# Patient Record
Sex: FEMALE | Race: WHITE | NOT HISPANIC OR LATINO | URBAN - METROPOLITAN AREA
[De-identification: names, ages, dates, MRNs, and addresses within clinical notes are randomized per-mention and may not be internally consistent; named-entity substitution may affect disease eponyms.]

---

## 2017-01-13 ENCOUNTER — OUTPATIENT (OUTPATIENT)
Dept: OUTPATIENT SERVICES | Facility: HOSPITAL | Age: 52
LOS: 1 days | Discharge: HOME | End: 2017-01-13

## 2017-06-27 DIAGNOSIS — E78.2 MIXED HYPERLIPIDEMIA: ICD-10-CM

## 2017-06-27 DIAGNOSIS — E03.9 HYPOTHYROIDISM, UNSPECIFIED: ICD-10-CM

## 2017-06-27 DIAGNOSIS — N39.0 URINARY TRACT INFECTION, SITE NOT SPECIFIED: ICD-10-CM

## 2018-01-31 ENCOUNTER — OUTPATIENT (OUTPATIENT)
Dept: OUTPATIENT SERVICES | Facility: HOSPITAL | Age: 53
LOS: 1 days | Discharge: HOME | End: 2018-01-31

## 2018-01-31 DIAGNOSIS — E03.9 HYPOTHYROIDISM, UNSPECIFIED: ICD-10-CM

## 2018-01-31 DIAGNOSIS — N39.0 URINARY TRACT INFECTION, SITE NOT SPECIFIED: ICD-10-CM

## 2018-01-31 DIAGNOSIS — E87.8 OTHER DISORDERS OF ELECTROLYTE AND FLUID BALANCE, NOT ELSEWHERE CLASSIFIED: ICD-10-CM

## 2018-01-31 DIAGNOSIS — E78.2 MIXED HYPERLIPIDEMIA: ICD-10-CM

## 2018-01-31 DIAGNOSIS — K75.9 INFLAMMATORY LIVER DISEASE, UNSPECIFIED: ICD-10-CM

## 2018-01-31 DIAGNOSIS — E55.9 VITAMIN D DEFICIENCY, UNSPECIFIED: ICD-10-CM

## 2018-01-31 DIAGNOSIS — D50.9 IRON DEFICIENCY ANEMIA, UNSPECIFIED: ICD-10-CM

## 2019-02-01 ENCOUNTER — OUTPATIENT (OUTPATIENT)
Dept: OUTPATIENT SERVICES | Facility: HOSPITAL | Age: 54
LOS: 1 days | Discharge: HOME | End: 2019-02-01

## 2019-02-01 DIAGNOSIS — E78.2 MIXED HYPERLIPIDEMIA: ICD-10-CM

## 2019-02-01 DIAGNOSIS — E03.9 HYPOTHYROIDISM, UNSPECIFIED: ICD-10-CM

## 2021-01-18 ENCOUNTER — TRANSCRIPTION ENCOUNTER (OUTPATIENT)
Age: 56
End: 2021-01-18

## 2023-08-09 ENCOUNTER — APPOINTMENT (OUTPATIENT)
Dept: CARDIOLOGY | Facility: CLINIC | Age: 58
End: 2023-08-09
Payer: COMMERCIAL

## 2023-08-09 VITALS
BODY MASS INDEX: 40.85 KG/M2 | HEIGHT: 62 IN | SYSTOLIC BLOOD PRESSURE: 132 MMHG | HEART RATE: 82 BPM | DIASTOLIC BLOOD PRESSURE: 84 MMHG | WEIGHT: 222 LBS

## 2023-08-09 DIAGNOSIS — Z87.891 PERSONAL HISTORY OF NICOTINE DEPENDENCE: ICD-10-CM

## 2023-08-09 DIAGNOSIS — Z82.49 FAMILY HISTORY OF ISCHEMIC HEART DISEASE AND OTHER DISEASES OF THE CIRCULATORY SYSTEM: ICD-10-CM

## 2023-08-09 DIAGNOSIS — Z78.9 OTHER SPECIFIED HEALTH STATUS: ICD-10-CM

## 2023-08-09 PROBLEM — Z00.00 ENCOUNTER FOR PREVENTIVE HEALTH EXAMINATION: Status: ACTIVE | Noted: 2023-08-09

## 2023-08-09 PROCEDURE — 93000 ELECTROCARDIOGRAM COMPLETE: CPT

## 2023-08-09 PROCEDURE — 99204 OFFICE O/P NEW MOD 45 MIN: CPT

## 2023-08-09 RX ORDER — LEVOTHYROXINE SODIUM 0.05 MG/1
50 TABLET ORAL
Refills: 0 | Status: ACTIVE | COMMUNITY

## 2023-08-09 RX ORDER — LISINOPRIL 20 MG/1
20 TABLET ORAL
Refills: 0 | Status: ACTIVE | COMMUNITY

## 2023-08-09 NOTE — REASON FOR VISIT
[Other: ____] : [unfilled] [FreeTextEntry1] : Diagnostic Tests:  ----------------------- [id number]. Normal EKG.

## 2023-08-09 NOTE — HISTORY OF PRESENT ILLNESS
[FreeTextEntry1] : Ms. Lake is a 58yo F with PMHx of HTN, HLD, hypothyroid who presents to establish care. Her PMD Is Dr. Stanley Soler. She has been having chest burning with exertion along with SOB. For last few weeks. She had stress test many years ago. She is able to walk about 0.5 miles before symptoms occur. She can walk up 1-2 flights of stairs without symptoms. She will get some skipped beats at night.

## 2023-08-09 NOTE — ASSESSMENT
[FreeTextEntry1] : Chest pain: Typical in nature. Given HTN, HLD and FHx cannot rule out ischemic cause.  -Check CCTA.  -Metoprolol pretreatment given.  -Check TTE.  -Will get labs from Dr. Soler.  -If CP worsens or changes in any way, she will proceed to hospital for further work up.   HTN: BP at goal per ACC/AHA 2018 guidelines -Continue with lisinopril 20mg PO daily.  -Check TTE.   HLD:  -Continue to monitor. -Need labs.   Hypothyroid:  -Continue with levothyroxine.   Palpitations: -If continuing, will consider MCOT.   Follow up in 3 months.

## 2023-08-10 ENCOUNTER — RX RENEWAL (OUTPATIENT)
Age: 58
End: 2023-08-10

## 2023-08-21 ENCOUNTER — RX RENEWAL (OUTPATIENT)
Age: 58
End: 2023-08-21

## 2023-10-03 ENCOUNTER — APPOINTMENT (OUTPATIENT)
Dept: CARDIOLOGY | Facility: CLINIC | Age: 58
End: 2023-10-03
Payer: COMMERCIAL

## 2023-10-03 PROCEDURE — 93306 TTE W/DOPPLER COMPLETE: CPT

## 2023-10-08 ENCOUNTER — TRANSCRIPTION ENCOUNTER (OUTPATIENT)
Age: 58
End: 2023-10-08

## 2023-11-08 ENCOUNTER — APPOINTMENT (OUTPATIENT)
Dept: CARDIOLOGY | Facility: CLINIC | Age: 58
End: 2023-11-08
Payer: COMMERCIAL

## 2023-11-08 VITALS
BODY MASS INDEX: 41.41 KG/M2 | DIASTOLIC BLOOD PRESSURE: 80 MMHG | SYSTOLIC BLOOD PRESSURE: 142 MMHG | HEIGHT: 62 IN | HEART RATE: 80 BPM | WEIGHT: 225 LBS

## 2023-11-08 VITALS — DIASTOLIC BLOOD PRESSURE: 82 MMHG | SYSTOLIC BLOOD PRESSURE: 126 MMHG

## 2023-11-08 PROCEDURE — 93000 ELECTROCARDIOGRAM COMPLETE: CPT

## 2023-11-08 PROCEDURE — 99213 OFFICE O/P EST LOW 20 MIN: CPT

## 2023-11-14 DIAGNOSIS — R07.9 CHEST PAIN, UNSPECIFIED: ICD-10-CM

## 2023-11-14 RX ORDER — ASPIRIN ENTERIC COATED TABLETS 81 MG 81 MG/1
81 TABLET, DELAYED RELEASE ORAL DAILY
Qty: 90 | Refills: 3 | Status: ACTIVE | COMMUNITY
Start: 2023-11-14 | End: 1900-01-01

## 2023-11-16 LAB
ALBUMIN SERPL ELPH-MCNC: 4.6 G/DL
ALP BLD-CCNC: 94 U/L
ALT SERPL-CCNC: 39 U/L
ANION GAP SERPL CALC-SCNC: 14 MMOL/L
APTT BLD: 31.8 SEC
AST SERPL-CCNC: 21 U/L
BASOPHILS # BLD AUTO: 0.02 K/UL
BASOPHILS NFR BLD AUTO: 0.2 %
BILIRUB SERPL-MCNC: 0.3 MG/DL
BUN SERPL-MCNC: 12 MG/DL
CALCIUM SERPL-MCNC: 9.8 MG/DL
CHLORIDE SERPL-SCNC: 101 MMOL/L
CO2 SERPL-SCNC: 26 MMOL/L
CREAT SERPL-MCNC: 0.7 MG/DL
EGFR: 99 ML/MIN/1.73M2
EOSINOPHIL # BLD AUTO: 0.12 K/UL
EOSINOPHIL NFR BLD AUTO: 1.4 %
GLUCOSE SERPL-MCNC: 81 MG/DL
HCT VFR BLD CALC: 39.9 %
HGB BLD-MCNC: 13 G/DL
IMM GRANULOCYTES NFR BLD AUTO: 0.5 %
INR PPP: 1.01 RATIO
LYMPHOCYTES # BLD AUTO: 2.53 K/UL
LYMPHOCYTES NFR BLD AUTO: 30 %
MAN DIFF?: NORMAL
MCHC RBC-ENTMCNC: 30 PG
MCHC RBC-ENTMCNC: 32.6 G/DL
MCV RBC AUTO: 92.1 FL
MONOCYTES # BLD AUTO: 0.62 K/UL
MONOCYTES NFR BLD AUTO: 7.4 %
NEUTROPHILS # BLD AUTO: 5.09 K/UL
NEUTROPHILS NFR BLD AUTO: 60.5 %
PLATELET # BLD AUTO: 263 K/UL
POTASSIUM SERPL-SCNC: 4.3 MMOL/L
PROT SERPL-MCNC: 7.2 G/DL
PT BLD: 11.5 SEC
RBC # BLD: 4.33 M/UL
RBC # FLD: 12.6 %
SODIUM SERPL-SCNC: 141 MMOL/L
WBC # FLD AUTO: 8.42 K/UL

## 2023-11-17 VITALS
DIASTOLIC BLOOD PRESSURE: 78 MMHG | HEART RATE: 83 BPM | OXYGEN SATURATION: 98 % | RESPIRATION RATE: 18 BRPM | SYSTOLIC BLOOD PRESSURE: 153 MMHG

## 2023-11-17 NOTE — H&P CARDIOLOGY - HISTORY OF PRESENT ILLNESS
Patient is 57 y/o female with PMHx: HTN, hypothyroidism, HLD, + family h/o CAD who presented to cardiologist with c/o CP with exertion and shortness of breath that has been occurring over the passed few weeks.   Patient presents today for [facility] with possible intervention.     Pre cath note:    indication:  [ ] STEMI                [ ] NSTEMI                 [ ] Acute coronary syndrome                     [ ]Unstable Angina   [ ] high risk  [ ] intermediate risk  [ ] low risk                     [ ] Stable Angina     non-invasive testing:                          Date:                     result: [ ] high risk  [ ] intermediate risk  [ ] low risk    Anti- Anginal medications:                    [ ] not used                       [x ] used :      [ ]CCB  [x ] BB  [ ] Nitrate [ ] Ranexa                [ ] not used but strong indication not to use    Ejection Fraction                   [ ] <29            [ ] 30-39%   [ ] 40-49%     [ x]>50%    CHF                   [ ] active (within last 14 days on meds   [ ] Chronic (on meds but no exacerbation)    COPD                   [ ] mild (on chronic bronchodilators)  [ ] moderate (on chronic steroid therapy)      [ ] severe (indication for home O2 or PACO2 >50)    Other risk factors:                       [ ] Previous MI                     [ ] CVA/ stroke                    [ ] carotid stent/ CEA                    [ ] PVD/PAD- (arterial aneurysm, non-palpable pulses, tortuous vessel with inability to insert catheter, infra-renal dissection, renal or subclavian artery stenosis)                    [ ] diabetic                    [ ] previous CABG                    [ ] Renal Failure             Right Jemal Test:    Adjusted Cath Bleeding Risk: 1.2%    Pre-hydration:   Patient is 59 y/o female with PMHx: HTN, hypothyroidism, HLD, + family h/o CAD who presented to cardiologist with c/o CP with exertion and shortness of breath that has been occurring over the passed few weeks.   Patient presents today for [facility] with possible intervention.     Pre cath note:    indication:  [ ] STEMI                [ ] NSTEMI                 [ ] Acute coronary syndrome                     [ ]Unstable Angina   [ ] high risk  [ ] intermediate risk  [ ] low risk                     [ ] Stable Angina     non-invasive testing:      CCTA     Date:                     result: [ ] high risk  [ ] intermediate risk  [ ] low risk    Anti- Anginal medications:                    [ ] not used                       [x ] used :     CCB/BB given precath              [ ] not used but strong indication not to use    Ejection Fraction                   [ ] <29            [ ] 30-39%   [ ] 40-49%     [ x]>50%    CHF                   [ ] active (within last 14 days on meds   [ ] Chronic (on meds but no exacerbation)    COPD                   [ ] mild (on chronic bronchodilators)  [ ] moderate (on chronic steroid therapy)      [ ] severe (indication for home O2 or PACO2 >50)    Other risk factors:                       [ ] Previous MI                     [ ] CVA/ stroke                    [ ] carotid stent/ CEA                    [ ] PVD/PAD- (arterial aneurysm, non-palpable pulses, tortuous vessel with inability to insert catheter, infra-renal dissection, renal or subclavian artery stenosis)                    [ ] diabetic                    [ ] previous CABG                    [ ] Renal Failure             Right Jemal Test: positive    Adjusted Cath Bleeding Risk: 1.2%    Pre-hydration: NS 250ml bolus x 1 hour then 75ml/hr until cath   Patient is 57 y/o female with PMHx: HTN, hypothyroidism, HLD, + family h/o CAD who presented to cardiologist with c/o CP with exertion and shortness of breath that has been occurring over the passed few weeks. ABN CCTA  Patient presents today for [facility] with possible intervention.     Pre cath note:    indication:  [ ] STEMI                [ ] NSTEMI                 [ ] Acute coronary syndrome                     [ ]Unstable Angina   [ ] high risk  [ ] intermediate risk  [ ] low risk                     [ ] Stable Angina     non-invasive testing:      CCTA     Date:                     result: [ ] high risk  [ ] intermediate risk  [ ] low risk    Anti- Anginal medications:                    [ ] not used                       [x ] used :     CCB/BB given precath              [ ] not used but strong indication not to use    Ejection Fraction                   [ ] <29            [ ] 30-39%   [ ] 40-49%     [ x]>50%    CHF                   [ ] active (within last 14 days on meds   [ ] Chronic (on meds but no exacerbation)    COPD                   [ ] mild (on chronic bronchodilators)  [ ] moderate (on chronic steroid therapy)      [ ] severe (indication for home O2 or PACO2 >50)    Other risk factors:                       [ ] Previous MI                     [ ] CVA/ stroke                    [ ] carotid stent/ CEA                    [ ] PVD/PAD- (arterial aneurysm, non-palpable pulses, tortuous vessel with inability to insert catheter, infra-renal dissection, renal or subclavian artery stenosis)                    [ ] diabetic                    [ ] previous CABG                    [ ] Renal Failure             Right Jemal Test: positive    Adjusted Cath Bleeding Risk: 1.2%    Pre-hydration: NS 250ml bolus x 1 hour then 75ml/hr until cath   Patient is 57 y/o female with PMHx: HTN, hypothyroidism, HLD, + family h/o CAD who presented to cardiologist with c/o CP with exertion and shortness of breath that has been occurring over the passed few weeks. ABN CCTA 11/20/23 CAC 66.2, mLAD severe stenosis.   Patient presents today for [facility] with possible intervention.     Pre cath note:    indication:  [ ] STEMI                [ ] NSTEMI                 [ ] Acute coronary syndrome                     [ ]Unstable Angina   [ ] high risk  [ ] intermediate risk  [ ] low risk                     [ ] Stable Angina     non-invasive testing:      CCTA     Date:                     result: [ ] high risk  [ ] intermediate risk  [ ] low risk    Anti- Anginal medications:                    [ ] not used                       [x ] used :     CCB/BB given precath              [ ] not used but strong indication not to use    Ejection Fraction                   [ ] <29            [ ] 30-39%   [ ] 40-49%     [ x]>50%    CHF                   [ ] active (within last 14 days on meds   [ ] Chronic (on meds but no exacerbation)    COPD                   [ ] mild (on chronic bronchodilators)  [ ] moderate (on chronic steroid therapy)      [ ] severe (indication for home O2 or PACO2 >50)    Other risk factors:                       [ ] Previous MI                     [ ] CVA/ stroke                    [ ] carotid stent/ CEA                    [ ] PVD/PAD- (arterial aneurysm, non-palpable pulses, tortuous vessel with inability to insert catheter, infra-renal dissection, renal or subclavian artery stenosis)                    [ ] diabetic                    [ ] previous CABG                    [ ] Renal Failure             Right Jemal Test: positive    Adjusted Cath Bleeding Risk: 1.2%    Pre-hydration: NS 250ml bolus x 1 hour then 75ml/hr until cath

## 2023-11-20 ENCOUNTER — INPATIENT (INPATIENT)
Facility: HOSPITAL | Age: 58
LOS: 0 days | Discharge: HOME | DRG: 322 | End: 2023-11-21
Attending: STUDENT IN AN ORGANIZED HEALTH CARE EDUCATION/TRAINING PROGRAM | Admitting: STUDENT IN AN ORGANIZED HEALTH CARE EDUCATION/TRAINING PROGRAM
Payer: COMMERCIAL

## 2023-11-20 ENCOUNTER — TRANSCRIPTION ENCOUNTER (OUTPATIENT)
Age: 58
End: 2023-11-20

## 2023-11-20 DIAGNOSIS — R07.9 CHEST PAIN, UNSPECIFIED: ICD-10-CM

## 2023-11-20 LAB
ANION GAP SERPL CALC-SCNC: 10 MMOL/L — SIGNIFICANT CHANGE UP (ref 7–14)
ANION GAP SERPL CALC-SCNC: 10 MMOL/L — SIGNIFICANT CHANGE UP (ref 7–14)
BUN SERPL-MCNC: 15 MG/DL — SIGNIFICANT CHANGE UP (ref 10–20)
BUN SERPL-MCNC: 15 MG/DL — SIGNIFICANT CHANGE UP (ref 10–20)
CALCIUM SERPL-MCNC: 9.9 MG/DL — SIGNIFICANT CHANGE UP (ref 8.4–10.4)
CALCIUM SERPL-MCNC: 9.9 MG/DL — SIGNIFICANT CHANGE UP (ref 8.4–10.4)
CHLORIDE SERPL-SCNC: 105 MMOL/L — SIGNIFICANT CHANGE UP (ref 98–110)
CHLORIDE SERPL-SCNC: 105 MMOL/L — SIGNIFICANT CHANGE UP (ref 98–110)
CO2 SERPL-SCNC: 27 MMOL/L — SIGNIFICANT CHANGE UP (ref 17–32)
CO2 SERPL-SCNC: 27 MMOL/L — SIGNIFICANT CHANGE UP (ref 17–32)
CREAT SERPL-MCNC: 0.7 MG/DL — SIGNIFICANT CHANGE UP (ref 0.7–1.5)
CREAT SERPL-MCNC: 0.7 MG/DL — SIGNIFICANT CHANGE UP (ref 0.7–1.5)
EGFR: 99 ML/MIN/1.73M2 — SIGNIFICANT CHANGE UP
EGFR: 99 ML/MIN/1.73M2 — SIGNIFICANT CHANGE UP
GLUCOSE SERPL-MCNC: 99 MG/DL — SIGNIFICANT CHANGE UP (ref 70–99)
GLUCOSE SERPL-MCNC: 99 MG/DL — SIGNIFICANT CHANGE UP (ref 70–99)
HCT VFR BLD CALC: 41.2 % — SIGNIFICANT CHANGE UP (ref 37–47)
HCT VFR BLD CALC: 41.2 % — SIGNIFICANT CHANGE UP (ref 37–47)
HGB BLD-MCNC: 13.7 G/DL — SIGNIFICANT CHANGE UP (ref 12–16)
HGB BLD-MCNC: 13.7 G/DL — SIGNIFICANT CHANGE UP (ref 12–16)
MCHC RBC-ENTMCNC: 29.3 PG — SIGNIFICANT CHANGE UP (ref 27–31)
MCHC RBC-ENTMCNC: 29.3 PG — SIGNIFICANT CHANGE UP (ref 27–31)
MCHC RBC-ENTMCNC: 33.3 G/DL — SIGNIFICANT CHANGE UP (ref 32–37)
MCHC RBC-ENTMCNC: 33.3 G/DL — SIGNIFICANT CHANGE UP (ref 32–37)
MCV RBC AUTO: 88.2 FL — SIGNIFICANT CHANGE UP (ref 81–99)
MCV RBC AUTO: 88.2 FL — SIGNIFICANT CHANGE UP (ref 81–99)
NRBC # BLD: 0 /100 WBCS — SIGNIFICANT CHANGE UP (ref 0–0)
NRBC # BLD: 0 /100 WBCS — SIGNIFICANT CHANGE UP (ref 0–0)
PLATELET # BLD AUTO: 289 K/UL — SIGNIFICANT CHANGE UP (ref 130–400)
PLATELET # BLD AUTO: 289 K/UL — SIGNIFICANT CHANGE UP (ref 130–400)
PMV BLD: 9.6 FL — SIGNIFICANT CHANGE UP (ref 7.4–10.4)
PMV BLD: 9.6 FL — SIGNIFICANT CHANGE UP (ref 7.4–10.4)
POTASSIUM SERPL-MCNC: 4.5 MMOL/L — SIGNIFICANT CHANGE UP (ref 3.5–5)
POTASSIUM SERPL-MCNC: 4.5 MMOL/L — SIGNIFICANT CHANGE UP (ref 3.5–5)
POTASSIUM SERPL-SCNC: 4.5 MMOL/L — SIGNIFICANT CHANGE UP (ref 3.5–5)
POTASSIUM SERPL-SCNC: 4.5 MMOL/L — SIGNIFICANT CHANGE UP (ref 3.5–5)
RBC # BLD: 4.67 M/UL — SIGNIFICANT CHANGE UP (ref 4.2–5.4)
RBC # BLD: 4.67 M/UL — SIGNIFICANT CHANGE UP (ref 4.2–5.4)
RBC # FLD: 12.2 % — SIGNIFICANT CHANGE UP (ref 11.5–14.5)
RBC # FLD: 12.2 % — SIGNIFICANT CHANGE UP (ref 11.5–14.5)
SODIUM SERPL-SCNC: 142 MMOL/L — SIGNIFICANT CHANGE UP (ref 135–146)
SODIUM SERPL-SCNC: 142 MMOL/L — SIGNIFICANT CHANGE UP (ref 135–146)
WBC # BLD: 8.68 K/UL — SIGNIFICANT CHANGE UP (ref 4.8–10.8)
WBC # BLD: 8.68 K/UL — SIGNIFICANT CHANGE UP (ref 4.8–10.8)
WBC # FLD AUTO: 8.68 K/UL — SIGNIFICANT CHANGE UP (ref 4.8–10.8)
WBC # FLD AUTO: 8.68 K/UL — SIGNIFICANT CHANGE UP (ref 4.8–10.8)

## 2023-11-20 PROCEDURE — 92978 ENDOLUMINL IVUS OCT C 1ST: CPT | Mod: RC

## 2023-11-20 PROCEDURE — 93005 ELECTROCARDIOGRAM TRACING: CPT

## 2023-11-20 PROCEDURE — 93458 L HRT ARTERY/VENTRICLE ANGIO: CPT | Mod: 59

## 2023-11-20 PROCEDURE — 36415 COLL VENOUS BLD VENIPUNCTURE: CPT

## 2023-11-20 PROCEDURE — 93458 L HRT ARTERY/VENTRICLE ANGIO: CPT | Mod: 26,XU

## 2023-11-20 PROCEDURE — 85027 COMPLETE CBC AUTOMATED: CPT

## 2023-11-20 PROCEDURE — C9600: CPT | Mod: RC

## 2023-11-20 PROCEDURE — 92978 ENDOLUMINL IVUS OCT C 1ST: CPT | Mod: 26

## 2023-11-20 PROCEDURE — 93010 ELECTROCARDIOGRAM REPORT: CPT

## 2023-11-20 PROCEDURE — 80048 BASIC METABOLIC PNL TOTAL CA: CPT

## 2023-11-20 PROCEDURE — 83735 ASSAY OF MAGNESIUM: CPT

## 2023-11-20 PROCEDURE — 92928 PRQ TCAT PLMT NTRAC ST 1 LES: CPT | Mod: RC

## 2023-11-20 RX ORDER — ONDANSETRON 8 MG/1
4 TABLET, FILM COATED ORAL ONCE
Refills: 0 | Status: DISCONTINUED | OUTPATIENT
Start: 2023-11-20 | End: 2023-11-21

## 2023-11-20 RX ORDER — CLOPIDOGREL BISULFATE 75 MG/1
1 TABLET, FILM COATED ORAL
Qty: 30 | Refills: 0
Start: 2023-11-20 | End: 2023-12-19

## 2023-11-20 RX ORDER — AMLODIPINE BESYLATE 2.5 MG/1
2.5 TABLET ORAL ONCE
Refills: 0 | Status: COMPLETED | OUTPATIENT
Start: 2023-11-20 | End: 2023-11-20

## 2023-11-20 RX ORDER — FAMOTIDINE 10 MG/ML
20 INJECTION INTRAVENOUS DAILY
Refills: 0 | Status: DISCONTINUED | OUTPATIENT
Start: 2023-11-20 | End: 2023-11-21

## 2023-11-20 RX ORDER — LISINOPRIL 2.5 MG/1
20 TABLET ORAL DAILY
Refills: 0 | Status: DISCONTINUED | OUTPATIENT
Start: 2023-11-20 | End: 2023-11-21

## 2023-11-20 RX ORDER — CLOPIDOGREL BISULFATE 75 MG/1
75 TABLET, FILM COATED ORAL DAILY
Refills: 0 | Status: DISCONTINUED | OUTPATIENT
Start: 2023-11-21 | End: 2023-11-21

## 2023-11-20 RX ORDER — ATORVASTATIN CALCIUM 80 MG/1
40 TABLET, FILM COATED ORAL AT BEDTIME
Refills: 0 | Status: DISCONTINUED | OUTPATIENT
Start: 2023-11-20 | End: 2023-11-21

## 2023-11-20 RX ORDER — METOPROLOL TARTRATE 50 MG
25 TABLET ORAL DAILY
Refills: 0 | Status: DISCONTINUED | OUTPATIENT
Start: 2023-11-20 | End: 2023-11-21

## 2023-11-20 RX ORDER — ASPIRIN/CALCIUM CARB/MAGNESIUM 324 MG
81 TABLET ORAL DAILY
Refills: 0 | Status: DISCONTINUED | OUTPATIENT
Start: 2023-11-21 | End: 2023-11-21

## 2023-11-20 RX ORDER — ASPIRIN/CALCIUM CARB/MAGNESIUM 324 MG
1 TABLET ORAL
Qty: 30 | Refills: 0
Start: 2023-11-20 | End: 2023-12-19

## 2023-11-20 RX ORDER — ASPIRIN/CALCIUM CARB/MAGNESIUM 324 MG
325 TABLET ORAL DAILY
Refills: 0 | Status: DISCONTINUED | OUTPATIENT
Start: 2023-11-20 | End: 2023-11-20

## 2023-11-20 RX ORDER — SODIUM CHLORIDE 9 MG/ML
250 INJECTION INTRAMUSCULAR; INTRAVENOUS; SUBCUTANEOUS ONCE
Refills: 0 | Status: COMPLETED | OUTPATIENT
Start: 2023-11-20 | End: 2023-11-20

## 2023-11-20 RX ORDER — LEVOTHYROXINE SODIUM 125 MCG
50 TABLET ORAL DAILY
Refills: 0 | Status: DISCONTINUED | OUTPATIENT
Start: 2023-11-20 | End: 2023-11-21

## 2023-11-20 RX ORDER — SODIUM CHLORIDE 9 MG/ML
1000 INJECTION INTRAMUSCULAR; INTRAVENOUS; SUBCUTANEOUS
Refills: 0 | Status: COMPLETED | OUTPATIENT
Start: 2023-11-20 | End: 2023-11-20

## 2023-11-20 RX ORDER — CLOPIDOGREL BISULFATE 75 MG/1
600 TABLET, FILM COATED ORAL ONCE
Refills: 0 | Status: COMPLETED | OUTPATIENT
Start: 2023-11-20 | End: 2023-11-20

## 2023-11-20 RX ORDER — SODIUM CHLORIDE 9 MG/ML
1000 INJECTION INTRAMUSCULAR; INTRAVENOUS; SUBCUTANEOUS
Refills: 0 | Status: DISCONTINUED | OUTPATIENT
Start: 2023-11-20 | End: 2023-11-21

## 2023-11-20 RX ORDER — ATORVASTATIN CALCIUM 80 MG/1
1 TABLET, FILM COATED ORAL
Qty: 30 | Refills: 0
Start: 2023-11-20 | End: 2023-12-19

## 2023-11-20 RX ORDER — FAMOTIDINE 10 MG/ML
20 INJECTION INTRAVENOUS ONCE
Refills: 0 | Status: COMPLETED | OUTPATIENT
Start: 2023-11-20 | End: 2023-11-20

## 2023-11-20 RX ORDER — METOPROLOL TARTRATE 50 MG
1 TABLET ORAL
Qty: 30 | Refills: 0
Start: 2023-11-20 | End: 2023-12-19

## 2023-11-20 RX ORDER — FAMOTIDINE 10 MG/ML
1 INJECTION INTRAVENOUS
Qty: 30 | Refills: 0
Start: 2023-11-20 | End: 2023-12-19

## 2023-11-20 RX ORDER — NITROGLYCERIN 6.5 MG
5 CAPSULE, EXTENDED RELEASE ORAL
Qty: 50 | Refills: 0 | Status: DISCONTINUED | OUTPATIENT
Start: 2023-11-20 | End: 2023-11-20

## 2023-11-20 RX ORDER — METOPROLOL TARTRATE 50 MG
12.5 TABLET ORAL ONCE
Refills: 0 | Status: COMPLETED | OUTPATIENT
Start: 2023-11-20 | End: 2023-11-20

## 2023-11-20 RX ADMIN — CLOPIDOGREL BISULFATE 600 MILLIGRAM(S): 75 TABLET, FILM COATED ORAL at 12:48

## 2023-11-20 RX ADMIN — FAMOTIDINE 20 MILLIGRAM(S): 10 INJECTION INTRAVENOUS at 13:32

## 2023-11-20 RX ADMIN — ATORVASTATIN CALCIUM 40 MILLIGRAM(S): 80 TABLET, FILM COATED ORAL at 21:30

## 2023-11-20 RX ADMIN — SODIUM CHLORIDE 150 MILLILITER(S): 9 INJECTION INTRAMUSCULAR; INTRAVENOUS; SUBCUTANEOUS at 21:00

## 2023-11-20 RX ADMIN — SODIUM CHLORIDE 100 MILLILITER(S): 9 INJECTION INTRAMUSCULAR; INTRAVENOUS; SUBCUTANEOUS at 21:00

## 2023-11-20 RX ADMIN — Medication 325 MILLIGRAM(S): at 12:34

## 2023-11-20 RX ADMIN — Medication 12.5 MILLIGRAM(S): at 12:30

## 2023-11-20 RX ADMIN — AMLODIPINE BESYLATE 2.5 MILLIGRAM(S): 2.5 TABLET ORAL at 12:30

## 2023-11-20 RX ADMIN — SODIUM CHLORIDE 150 MILLILITER(S): 9 INJECTION INTRAMUSCULAR; INTRAVENOUS; SUBCUTANEOUS at 14:40

## 2023-11-20 RX ADMIN — SODIUM CHLORIDE 250 MILLILITER(S): 9 INJECTION INTRAMUSCULAR; INTRAVENOUS; SUBCUTANEOUS at 12:30

## 2023-11-20 NOTE — ASU PATIENT PROFILE, ADULT - PRO ARRIVE FROM
Controlled medication requested: amphetamine-dextroamphetamine (ADDERALL XR) 20 MG 24 hr capsule    Last drug screen/contract: NA     Last refilled per PDMP: Unable to view providers PDMP    Last seen by PCP: 5/23/22 CPE  Recommendations: ADHD (attention deficit hyperactivity disorder), combined type: Continue current management. Refills provided.    Advised to follow up in: 1y    Next visit with PCP on: 5/25/23    Refill: pended to provider for approval. Please advise, thank you.   home

## 2023-11-20 NOTE — DISCHARGE NOTE PROVIDER - ATTENDING DISCHARGE PHYSICAL EXAMINATION:
I saw and evaluated the patient the day of discharge.  They are s/p elective LHC demonstrating significant 2 vessel disease, now s/p PCI of the mRCA with plans for staged PCI of the LAD in 4-6 weeks.  Day of discharge: well appearing, no active complaints, hemodynamically stable.  Discharge with close follow up with cardiology.

## 2023-11-20 NOTE — DISCHARGE NOTE PROVIDER - NSDCFUSCHEDAPPT_GEN_ALL_CORE_FT
Venkatesh Vo Physician [facility]  CARDIOLOGY [address]  Scheduled Appointment: 12/12/2023

## 2023-11-20 NOTE — PATIENT PROFILE ADULT - FUNCTIONAL ASSESSMENT - DAILY ACTIVITY SECTION LABEL
Bursitis: Care Instructions  Your Care Instructions  A bursa is a small sac of fluid that helps the tissues around a joint slide over one another easily. Injury or overuse of a joint can cause pain, redness, and inflammation in the bursa (bursitis). Bursitis usually gets better if you avoid the activity that caused it. You can help prevent bursitis from coming back by doing stretching and strengthening exercises. You may also need to change the way you do some activities. Follow-up care is a key part of your treatment and safety. Be sure to make and go to all appointments, and call your doctor if you are having problems. Its also a good idea to know your test results and keep a list of the medicines you take. How can you care for yourself at home? · Put ice or a cold pack on the area for 10 to 20 minutes at a time. Try to do this every 1 to 2 hours for the next 3 days (when you are awake) or until the swelling goes down. Put a thin cloth between the ice and your skin. · After the 3 days of using ice, you may use heat on the area. You can use a hot water bottle; a warm, moist towel; or a heating pad set on low. You can also try alternating heat and ice. · Rest the area where you have pain. Stop any activities that cause pain. Switch to activities that do not stress the area. · Take pain medicines exactly as directed. ¨ If the doctor gave you a prescription medicine for pain, take it as prescribed. ¨ If you are not taking a prescription pain medicine, ask your doctor if you can take an over-the-counter medicine. ¨ Do not take two or more pain medicines at the same time unless the doctor told you to. Many pain medicines have acetaminophen, which is Tylenol. Too much acetaminophen (Tylenol) can be harmful. · To prevent stiffness, gently move the joint as much as you can without pain every day. As the pain gets better, keep doing range-of-motion exercises.  Ask your doctor for exercises that will make the muscles around the joint stronger. Do these as directed. · You can slowly return to the activity that caused the pain, but do it with less effort until you can do it without pain or swelling. Be sure to warm up before and stretch after you do the activity. When should you call for help? Call your doctor now or seek immediate medical care if:  · You get a fever and chills. · You have increased swelling or redness in a joint. · You cannot use a joint, or the pain in a joint gets worse. Watch closely for changes in your health, and be sure to contact your doctor if:  · You have pain for 2 weeks or longer despite home treatment. Where can you learn more? Go to http://jocelyn-yaritza.info/. Enter T703 in the search box to learn more about \"Bursitis: Care Instructions. \"  Current as of: May 23, 2016  Content Version: 11.2  © 9130-6045 [employer]. Care instructions adapted under license by [employer] (which disclaims liability or warranty for this information). If you have questions about a medical condition or this instruction, always ask your healthcare professional. [place] [address] any warranty or liability for your use of this information. .

## 2023-11-20 NOTE — DISCHARGE NOTE PROVIDER - NSDCMRMEDTOKEN_GEN_ALL_CORE_FT
Aspirin Enteric Coated 81 mg oral delayed release tablet: 1 tab(s) orally once a day MDD: 1  atorvastatin 40 mg oral tablet: 1 tab(s) orally once a day (at bedtime) MDD: 1  clopidogrel 75 mg oral tablet: 1 tab(s) orally once a day MDD: 1  famotidine 20 mg oral tablet: 1 tab(s) orally once a day MDD: 1  levothyroxine 50 mcg (0.05 mg) oral tablet: 1 tab(s) orally once a day  lisinopril 20 mg oral tablet: 1 tab(s) orally once a day  Toprol-XL 25 mg oral tablet, extended release: 1 tab(s) orally once a day MDD: 1

## 2023-11-20 NOTE — PROGRESS NOTE ADULT - SUBJECTIVE AND OBJECTIVE BOX
Cardiology Follow up    BLAINE LOCKE   58y Female  PAST MEDICAL & SURGICAL HISTORY:  HTN (hypertension)      HLD (hyperlipidemia)      Hypothyroidism           HPI:  Patient is 59 y/o female with PMHx: HTN, hypothyroidism, HLD, + family h/o CAD who presented to cardiologist with c/o CP with exertion and shortness of breath that has been occurring over the passed few weeks. ABN CCTA 11/20/23 CAC 66.2, mLAD severe stenosis.   Patient presents today for [facility] with possible intervention.     Pre cath note:    indication:  [ ] STEMI                [ ] NSTEMI                 [ ] Acute coronary syndrome                     [ ]Unstable Angina   [ ] high risk  [ ] intermediate risk  [ ] low risk                     [ ] Stable Angina     non-invasive testing:      CCTA     Date:                     result: [ ] high risk  [ ] intermediate risk  [ ] low risk    Anti- Anginal medications:                    [ ] not used                       [x ] used :     CCB/BB given precath              [ ] not used but strong indication not to use    Ejection Fraction                   [ ] <29            [ ] 30-39%   [ ] 40-49%     [ x]>50%    CHF                   [ ] active (within last 14 days on meds   [ ] Chronic (on meds but no exacerbation)    COPD                   [ ] mild (on chronic bronchodilators)  [ ] moderate (on chronic steroid therapy)      [ ] severe (indication for home O2 or PACO2 >50)    Other risk factors:                       [ ] Previous MI                     [ ] CVA/ stroke                    [ ] carotid stent/ CEA                    [ ] PVD/PAD- (arterial aneurysm, non-palpable pulses, tortuous vessel with inability to insert catheter, infra-renal dissection, renal or subclavian artery stenosis)                    [ ] diabetic                    [ ] previous CABG                    [ ] Renal Failure             Right Jemal Test: positive    Adjusted Cath Bleeding Risk: 1.2%    Pre-hydration: NS 250ml bolus x 1 hour then 75ml/hr until cath   (17 Nov 2023 15:28)    Allergies    doxycycline (Rash)    Intolerances      Patient seen and examined at bedside.   Patient without complaints.   Denies CP, SOB, palpitations, or dizziness    Vital Signs  HR: 73  BP: 163/74  SpO2: 98%  on RA        MEDICATIONS  (STANDING):  aspirin enteric coated 325 milliGRAM(s) Oral daily  nitroglycerin  Infusion 5 MICROgram(s)/Min (1.5 mL/Hr) IV Continuous <Continuous>  ondansetron Injectable 4 milliGRAM(s) IV Push once  sodium chloride 0.9%. 1000 milliLiter(s) (150 mL/Hr) IV Continuous <Continuous>    MEDICATIONS  (PRN):      REVIEW OF SYSTEMS:          All negative except as mentioned in HPI    PHYSICAL EXAM:           CONSTITUTIONAL: Well-developed; well-nourished; in no acute distress  	SKIN: warm, dry  	HEAD: Normocephalic; atraumatic  	EYES: PERRL.  	ENT: No nasal discharge, airway clear, mucous membranes moist  	NECK: Supple; non tender.  	CARD: +S1, +S2, no murmurs, gallops, or rubs. Regular rate and rhythm    	RESP: No wheezes, rales or rhonchi. CTA B/L  	ABD: soft ntnd, + BS x 4 quadrants  	EXT: moves all extremities,  no clubbing, cyanosis or edema  	NEURO: Alert and oriented x3, no focal deficits          PSYCH: Cooperative, appropriate          VASCULAR:  + Rad / + PTs / + DPs          EXTREMITY:             	   Right Radial: Dressing D/C/I, DSTAT in place, access site soft, no hematoma, no pain, + pulses, no sign of infection, no numbness            ECG: pending    LABS:                        13.7   8.68  )-----------( 289      ( 20 Nov 2023 12:35 )             41.2     11-20    142  |  105  |  15  ----------------------------<  99  4.5   |  27  |  0.7    Ca    9.9      20 Nov 2023 12:35      A/P:  I discussed the case with Cardiologist Dr. Ricardo  and recommend the following:    S/P PCI:      Intervention:   Successful IVUS guided PCI with PAM to mid RCA  AUC score of 7    Implants:    STAN FRONTIER RX [id number]    FINDINGS:     Coronary Dominance:   Right    LM:   no disease     LAD:   proximal 80% lesion   mid moderate disease   distal mild disease     D1   mild disease       CX:   mild disease     RCA:   proximal mild disease   mid mild disease   distal 90% lesion      LVEDP: 17 mmHg             	     Continue DAPT ( Aspirin 81 mg PO Daily and Plavix 75mg po daily ),  B-Blocker, Statin Therapy, lisinopril                   Patient given 30 day supply of ( Aspirin 81 mg daily and Plavix 75 mg daily ) to take at home                   START Famotidine 20mg po daily                   CBC at 1830                   EKG prior to d/c at 1830                   NS at 2030 ml/hr x 6hrs                   OOB Ambulate                    Monitor access site/ distal pulses                   Patient agreeing to take DAPT for at least one year or as directed by cardiologist                    Pt given instructions on importance of taking antiplatelet medication or risk acute stent thrombosis/death                   Post cath instructions, access site care and activity restrictions reviewed with patient                     Discussed with patient to return to hospital if experience chest pain, shortness breath, dizziness and site bleeding                   Aggressive risk factor modification, diet counseling, smoking cessation discussed with patient                    Benefits of cardiac rehab discussed with patient and all documents sent to cardiac rehab center                   Patient instructed to call cardiac rehab and make initial appointment after first follow-up visit with Cardiologist                    Return for staged procedure to LAD in 4-6 weeks                   Can discharge patient at 2030 from cardiac standpoint after ambulating without symptoms, access site wnl, labs and ECG reviewed                    Follow up with Cardiology Dr. Vo  in two weeks.  Instructed to call and make an appointment

## 2023-11-20 NOTE — ASU DISCHARGE PLAN (ADULT/PEDIATRIC) - CARE PROVIDER_API CALL
Venkatesh Vo  Cardiology  [address] [place] [id number]  Phone: [phone]  Fax: [phone]  Follow Up Time: 2 weeks

## 2023-11-20 NOTE — PATIENT PROFILE ADULT - FALL HARM RISK - HARM RISK INTERVENTIONS

## 2023-11-20 NOTE — DISCHARGE NOTE PROVIDER - CARE PROVIDER_API CALL
Venkatesh Vo  Cardiology  [address] [place] [id number]  Phone: [phone]  Fax: [phone]  Established Patient  Follow Up Time: 2 weeks

## 2023-11-20 NOTE — DISCHARGE NOTE PROVIDER - HOSPITAL COURSE
57 y/o female with PMHx: HTN, hypothyroidism, HLD, + family h/o CAD who presented to cardiologist with c/o CP with exertion and shortness of breath that has been occurring over the passed few weeks. ABN CCTA 11/20/23 CAC 66.2, mLAD severe stenosis. Pt presented to [facility] for elective cardiac catheterization.     On 11/20/23patient underwent LHC which revealed: distal RCA with 90% lesion. s/p successful IVUS guided PCI with 1x PAM to mid RCA (STAN FRONTIER RX [id number]). Post-procedure pt with c/o chest discomfort in which she was trialed on nitro gtt and has now weaned off.     Patient was monitored overnight. On POD 1 patient remains HD stable with no complaints. Patient remains in SR with no arrhythmias noted on tele. EKG performed showed no acute ST changes. Examination of right radial artery access site showed a C/DI site with no hematoma, erythema or bruit. Distal pulses are 2+ bilaterally. Renal function remains stable post cath. Patient will be discharged home on DAPT with ASA and Plavix. Patient was also found to have 80% stenosis in the proximal LAD and will return in 6 weeks for staged PCI to LAD. Patient is being DC home in stable condition.    Left heart catheterization findings 11/20/23:  Intervention:   Successful IVUS guided PCI with PAM to mid RCA  AUC score of 7  Implants:    STAN FRONTIER RX [id number]  FINDINGS:   Coronary Dominance:   Right  LM:   no disease   LAD:   proximal 80% lesion   mid moderate disease   distal mild disease   D1   mild disease   CX:   mild disease   RCA:   proximal mild disease   mid mild disease   distal 90% lesion      LVEDP: 17 mmHg

## 2023-11-20 NOTE — CHART NOTE - NSCHARTNOTEFT_GEN_A_CORE
PRE-OP DIAGNOSIS:        PROCEDURE:   [x] Coronary Angiogram   [x] [facility]   [x] Intervention (see below)         PHYSICIAN:    Dr. Ricardo     ASSISTANT:    Dr. Sena Rod     PROCEDURE DESCRIPTION:     Consent:      [x] Patient     [] Family Member     []  Used        Anesthesia:     [] General     [x] Sedation   [x] Local        Access & Closure:     [x]6 Fr right Radial Artery > D-stat          IV Contrast: 75mL        Intervention:   Successful IVUS guided PCI with PAM to mid RCA  AUC score of 7    Implants:    STAN FRONTIER RX [id number]    FINDINGS:     Coronary Dominance:   Right    LM:   no disease     LAD:   proximal 80% lesion   mid moderate disease   distal mild disease     D1   mild disease       CX:   mild disease     RCA:   proximal mild disease   mid mild disease   distal 90% lesion      LVEDP: 17 mmHg            ESTIMATED BLOOD LOSS: < 10 mL        CONDITION:     [x] Good     [] Fair     [] Critical        SPECIMEN REMOVED: N/A       POST-OP DIAGNOSIS:    Two vessel coronary artery disease   Successful IVUS guided PCI with PAM to mid RCA (STAN FRONTIER RX [id number])  AUC score of 7       PLAN OF CARE:     [x] D/C Home Today   [x] Medications:   DAPT with aspirin and plavix  cont with OMT   [x] Post-procedure LVEDP-guided IV fluids:   [x] Encourage oral fluids daily PRE-OP DIAGNOSIS:        PROCEDURE:   [x] Coronary Angiogram   [x] [facility]   [x] Intervention (see below)         PHYSICIAN:    Dr. Ricardo     ASSISTANT:    Dr. Sena Rod     PROCEDURE DESCRIPTION:     Consent:      [x] Patient     [] Family Member     []  Used        Anesthesia:     [] General     [x] Sedation   [x] Local        Access & Closure:     [x]6 Fr right Radial Artery > D-stat          IV Contrast: 75mL        Intervention:   Successful IVUS guided PCI with PAM to mid RCA  AUC score of 7    Implants:    STAN FRONTIER RX [id number]    FINDINGS:     Coronary Dominance:   Right    LM:   no disease     LAD:   proximal 80% lesion   mid moderate disease   distal mild disease     D1   mild disease       CX:   mild disease     RCA:   proximal mild disease   mid mild disease   distal 95% lesion      LVEDP: 17 mmHg            ESTIMATED BLOOD LOSS: < 10 mL        CONDITION:     [x] Good     [] Fair     [] Critical        SPECIMEN REMOVED: N/A       POST-OP DIAGNOSIS:    Two vessel coronary artery disease   Successful IVUS guided PCI with PAM to mid RCA (STAN FRONTIER RX [id number])  AUC score of 7       PLAN OF CARE:     [x] D/C Home Today   [x] Medications:   DAPT with aspirin and plavix  cont with OMT   [x] Post-procedure LVEDP-guided IV fluids:   [x] Encourage oral fluids daily

## 2023-11-21 ENCOUNTER — TRANSCRIPTION ENCOUNTER (OUTPATIENT)
Age: 58
End: 2023-11-21

## 2023-11-21 VITALS — DIASTOLIC BLOOD PRESSURE: 79 MMHG | SYSTOLIC BLOOD PRESSURE: 152 MMHG | TEMPERATURE: 98 F | HEART RATE: 66 BPM

## 2023-11-21 LAB
ANION GAP SERPL CALC-SCNC: 11 MMOL/L — SIGNIFICANT CHANGE UP (ref 7–14)
ANION GAP SERPL CALC-SCNC: 11 MMOL/L — SIGNIFICANT CHANGE UP (ref 7–14)
BUN SERPL-MCNC: 14 MG/DL — SIGNIFICANT CHANGE UP (ref 10–20)
BUN SERPL-MCNC: 14 MG/DL — SIGNIFICANT CHANGE UP (ref 10–20)
CALCIUM SERPL-MCNC: 8.7 MG/DL — SIGNIFICANT CHANGE UP (ref 8.4–10.4)
CALCIUM SERPL-MCNC: 8.7 MG/DL — SIGNIFICANT CHANGE UP (ref 8.4–10.4)
CHLORIDE SERPL-SCNC: 108 MMOL/L — SIGNIFICANT CHANGE UP (ref 98–110)
CHLORIDE SERPL-SCNC: 108 MMOL/L — SIGNIFICANT CHANGE UP (ref 98–110)
CO2 SERPL-SCNC: 24 MMOL/L — SIGNIFICANT CHANGE UP (ref 17–32)
CO2 SERPL-SCNC: 24 MMOL/L — SIGNIFICANT CHANGE UP (ref 17–32)
CREAT SERPL-MCNC: 0.6 MG/DL — LOW (ref 0.7–1.5)
CREAT SERPL-MCNC: 0.6 MG/DL — LOW (ref 0.7–1.5)
EGFR: 103 ML/MIN/1.73M2 — SIGNIFICANT CHANGE UP
EGFR: 103 ML/MIN/1.73M2 — SIGNIFICANT CHANGE UP
GLUCOSE SERPL-MCNC: 88 MG/DL — SIGNIFICANT CHANGE UP (ref 70–99)
GLUCOSE SERPL-MCNC: 88 MG/DL — SIGNIFICANT CHANGE UP (ref 70–99)
HCT VFR BLD CALC: 36.2 % — LOW (ref 37–47)
HCT VFR BLD CALC: 36.2 % — LOW (ref 37–47)
HGB BLD-MCNC: 11.8 G/DL — LOW (ref 12–16)
HGB BLD-MCNC: 11.8 G/DL — LOW (ref 12–16)
MAGNESIUM SERPL-MCNC: 1.9 MG/DL — SIGNIFICANT CHANGE UP (ref 1.8–2.4)
MAGNESIUM SERPL-MCNC: 1.9 MG/DL — SIGNIFICANT CHANGE UP (ref 1.8–2.4)
MCHC RBC-ENTMCNC: 29.7 PG — SIGNIFICANT CHANGE UP (ref 27–31)
MCHC RBC-ENTMCNC: 29.7 PG — SIGNIFICANT CHANGE UP (ref 27–31)
MCHC RBC-ENTMCNC: 32.6 G/DL — SIGNIFICANT CHANGE UP (ref 32–37)
MCHC RBC-ENTMCNC: 32.6 G/DL — SIGNIFICANT CHANGE UP (ref 32–37)
MCV RBC AUTO: 91.2 FL — SIGNIFICANT CHANGE UP (ref 81–99)
MCV RBC AUTO: 91.2 FL — SIGNIFICANT CHANGE UP (ref 81–99)
NRBC # BLD: 0 /100 WBCS — SIGNIFICANT CHANGE UP (ref 0–0)
NRBC # BLD: 0 /100 WBCS — SIGNIFICANT CHANGE UP (ref 0–0)
PLATELET # BLD AUTO: 225 K/UL — SIGNIFICANT CHANGE UP (ref 130–400)
PLATELET # BLD AUTO: 225 K/UL — SIGNIFICANT CHANGE UP (ref 130–400)
PMV BLD: 9.7 FL — SIGNIFICANT CHANGE UP (ref 7.4–10.4)
PMV BLD: 9.7 FL — SIGNIFICANT CHANGE UP (ref 7.4–10.4)
POTASSIUM SERPL-MCNC: 4.3 MMOL/L — SIGNIFICANT CHANGE UP (ref 3.5–5)
POTASSIUM SERPL-MCNC: 4.3 MMOL/L — SIGNIFICANT CHANGE UP (ref 3.5–5)
POTASSIUM SERPL-SCNC: 4.3 MMOL/L — SIGNIFICANT CHANGE UP (ref 3.5–5)
POTASSIUM SERPL-SCNC: 4.3 MMOL/L — SIGNIFICANT CHANGE UP (ref 3.5–5)
RBC # BLD: 3.97 M/UL — LOW (ref 4.2–5.4)
RBC # BLD: 3.97 M/UL — LOW (ref 4.2–5.4)
RBC # FLD: 12.2 % — SIGNIFICANT CHANGE UP (ref 11.5–14.5)
RBC # FLD: 12.2 % — SIGNIFICANT CHANGE UP (ref 11.5–14.5)
SODIUM SERPL-SCNC: 143 MMOL/L — SIGNIFICANT CHANGE UP (ref 135–146)
SODIUM SERPL-SCNC: 143 MMOL/L — SIGNIFICANT CHANGE UP (ref 135–146)
WBC # BLD: 8.02 K/UL — SIGNIFICANT CHANGE UP (ref 4.8–10.8)
WBC # BLD: 8.02 K/UL — SIGNIFICANT CHANGE UP (ref 4.8–10.8)
WBC # FLD AUTO: 8.02 K/UL — SIGNIFICANT CHANGE UP (ref 4.8–10.8)
WBC # FLD AUTO: 8.02 K/UL — SIGNIFICANT CHANGE UP (ref 4.8–10.8)

## 2023-11-21 PROCEDURE — 93010 ELECTROCARDIOGRAM REPORT: CPT

## 2023-11-21 PROCEDURE — 99238 HOSP IP/OBS DSCHRG MGMT 30/<: CPT

## 2023-11-21 RX ADMIN — FAMOTIDINE 20 MILLIGRAM(S): 10 INJECTION INTRAVENOUS at 11:45

## 2023-11-21 RX ADMIN — Medication 81 MILLIGRAM(S): at 11:45

## 2023-11-21 RX ADMIN — LISINOPRIL 20 MILLIGRAM(S): 2.5 TABLET ORAL at 05:09

## 2023-11-21 RX ADMIN — Medication 50 MICROGRAM(S): at 05:09

## 2023-11-21 RX ADMIN — CLOPIDOGREL BISULFATE 75 MILLIGRAM(S): 75 TABLET, FILM COATED ORAL at 11:45

## 2023-11-21 RX ADMIN — Medication 25 MILLIGRAM(S): at 05:08

## 2023-11-21 NOTE — DISCHARGE NOTE NURSING/CASE MANAGEMENT/SOCIAL WORK - PATIENT PORTAL LINK FT
You can access the FollowMyHealth Patient Portal offered by [facility] by registering at the following website: [email or web address]. By joining [facility]’s FollowMyHealth portal, you will also be able to view your health information using other applications (apps) compatible with our system.

## 2023-11-21 NOTE — DISCHARGE NOTE NURSING/CASE MANAGEMENT/SOCIAL WORK - NSDCPEFALRISK_GEN_ALL_CORE
For information on Fall & Injury Prevention, visit: [email or web address] OR  [email or web address] OR  https://www.cdc.gov/steadi/patient.html

## 2023-11-24 DIAGNOSIS — I25.119 ATHEROSCLEROTIC HEART DISEASE OF NATIVE CORONARY ARTERY WITH UNSPECIFIED ANGINA PECTORIS: ICD-10-CM

## 2023-11-24 DIAGNOSIS — E78.5 HYPERLIPIDEMIA, UNSPECIFIED: ICD-10-CM

## 2023-11-24 DIAGNOSIS — Z82.49 FAMILY HISTORY OF ISCHEMIC HEART DISEASE AND OTHER DISEASES OF THE CIRCULATORY SYSTEM: ICD-10-CM

## 2023-11-24 DIAGNOSIS — E03.9 HYPOTHYROIDISM, UNSPECIFIED: ICD-10-CM

## 2023-11-24 DIAGNOSIS — I10 ESSENTIAL (PRIMARY) HYPERTENSION: ICD-10-CM

## 2023-11-26 ENCOUNTER — NON-APPOINTMENT (OUTPATIENT)
Age: 58
End: 2023-11-26

## 2023-12-11 ENCOUNTER — APPOINTMENT (OUTPATIENT)
Dept: CARDIOLOGY | Facility: CLINIC | Age: 58
End: 2023-12-11
Payer: COMMERCIAL

## 2023-12-11 VITALS
BODY MASS INDEX: 39.93 KG/M2 | HEIGHT: 62 IN | DIASTOLIC BLOOD PRESSURE: 80 MMHG | HEART RATE: 76 BPM | SYSTOLIC BLOOD PRESSURE: 124 MMHG | WEIGHT: 217 LBS | OXYGEN SATURATION: 98 %

## 2023-12-11 PROBLEM — I10 ESSENTIAL (PRIMARY) HYPERTENSION: Chronic | Status: ACTIVE | Noted: 2023-11-17

## 2023-12-11 PROCEDURE — 99214 OFFICE O/P EST MOD 30 MIN: CPT

## 2023-12-11 RX ORDER — ROSUVASTATIN CALCIUM 20 MG/1
20 TABLET, FILM COATED ORAL DAILY
Qty: 90 | Refills: 3 | Status: DISCONTINUED | COMMUNITY
Start: 2023-11-14 | End: 2023-12-11

## 2023-12-11 RX ORDER — METOPROLOL TARTRATE 100 MG/1
100 TABLET, FILM COATED ORAL
Qty: 2 | Refills: 0 | Status: DISCONTINUED | COMMUNITY
Start: 2023-08-09 | End: 2023-12-11

## 2023-12-11 RX ORDER — FAMOTIDINE 20 MG/1
20 TABLET, FILM COATED ORAL DAILY
Qty: 90 | Refills: 3 | Status: ACTIVE | COMMUNITY
Start: 2023-12-11 | End: 1900-01-01

## 2023-12-12 PROBLEM — E78.5 HYPERLIPIDEMIA, UNSPECIFIED: Chronic | Status: ACTIVE | Noted: 2023-11-17

## 2023-12-12 PROBLEM — E03.9 HYPOTHYROIDISM, UNSPECIFIED: Chronic | Status: ACTIVE | Noted: 2023-11-17

## 2023-12-13 ENCOUNTER — NON-APPOINTMENT (OUTPATIENT)
Age: 58
End: 2023-12-13

## 2023-12-18 RX ORDER — CLOPIDOGREL BISULFATE 75 MG/1
75 TABLET, FILM COATED ORAL DAILY
Qty: 90 | Refills: 3 | Status: ACTIVE | COMMUNITY
Start: 1900-01-01 | End: 1900-01-01

## 2023-12-18 RX ORDER — ATORVASTATIN CALCIUM 40 MG/1
40 TABLET, FILM COATED ORAL DAILY
Qty: 90 | Refills: 3 | Status: ACTIVE | COMMUNITY
Start: 1900-01-01 | End: 1900-01-01

## 2023-12-26 VITALS — DIASTOLIC BLOOD PRESSURE: 76 MMHG | HEART RATE: 67 BPM | OXYGEN SATURATION: 98 % | SYSTOLIC BLOOD PRESSURE: 138 MMHG

## 2023-12-26 NOTE — H&P CARDIOLOGY - NSICDXPASTMEDICALHX_GEN_ALL_CORE_FT
PAST MEDICAL HISTORY:  CAD (coronary artery disease)     HLD (hyperlipidemia)     HTN (hypertension)     Hypothyroidism

## 2023-12-26 NOTE — H&P CARDIOLOGY - HISTORY OF PRESENT ILLNESS
57 y/o F with + family h/o CAD, PMHx of HTN, HLD, hypothyroidism, who initially presented to cardiologist with c/o CP with exertion and shortness of breath that has been occurring over the passed few weeks, had an abnormal CCTA with CAC 66.2, mLAD severe stenosis.  On 11/20/23 patient underwent LHC which revealed distal RCA with 90% lesion, s/p successful IVUS guided PCI with 1x PAM to mid RCA (STAN FRONTIER RX 3.27Y61LA), with residual LAD dz.  Pt was on Nitro gtt post-cath for c/o chest discomfort, was monitored overnight, and discharged home the next day.  Pt now presents for recommended staged PCI of LAD.     Left heart catheterization findings 11/20/23:  Intervention:   Successful IVUS guided PCI with PAM to mid RCA  AUC score of 7  Implants:    STAN FRONTIER RX [id number]    FINDINGS:   Coronary Dominance:   Right  LM:   no disease   LAD:   proximal 80% lesion   mid moderate disease   distal mild disease   D1   mild disease   CX:   mild disease   RCA:   proximal mild disease   mid mild disease   distal 90% lesion      LVEDP: 17 mmHg       Pre cath note:    indication:  [ ] STEMI                [ ] NSTEMI                 [ ] Acute coronary syndrome                     [ ]Unstable Angina   [ ] high risk  [ ] intermediate risk  [ ] low risk                     [x ] Stable Angina     non-invasive testing:      CCTA     Date:   11/2023           result: [ ] high risk  [ x] intermediate risk  [ ] low risk    Anti- Anginal medications:                    [ ] not used                       [x ] used :     BB               [ ] not used but strong indication not to use    Ejection Fraction                   [ ] <29            [ ] 30-39%   [ ] 40-49%     [ x]>50%    CHF                   [ ] active (within last 14 days on meds   [ ] Chronic (on meds but no exacerbation)    COPD                   [ ] mild (on chronic bronchodilators)  [ ] moderate (on chronic steroid therapy)      [ ] severe (indication for home O2 or PACO2 >50)    Other risk factors:                       [ ] Previous MI                     [ ] CVA/ stroke                    [ ] carotid stent/ CEA                    [ ] PVD/PAD- (arterial aneurysm, non-palpable pulses, tortuous vessel with inability to insert catheter, infra-renal dissection, renal or subclavian artery stenosis)                    [ ] diabetic                    [ ] previous CABG                    [ ] Renal Failure        Right Jemla Test:     Adjusted Cath Bleeding Risk:     Pre-hydration:              57 y/o F with + family h/o CAD, PMHx of HTN, HLD, hypothyroidism, who initially presented to cardiologist with c/o CP with exertion and shortness of breath that has been occurring over the passed few weeks, had an abnormal CCTA with CAC 66.2, mLAD severe stenosis.  On 11/20/23 patient underwent LHC which revealed distal RCA with 90% lesion, s/p successful IVUS guided PCI with 1x PAM to mid RCA (STAN FRONTIER RX 3.36E19CW), with residual LAD dz.  Pt was on Nitro gtt post-cath for c/o chest discomfort, was monitored overnight, and discharged home the next day.  Pt now presents for recommended staged PCI of LAD.     Left heart catheterization findings 11/20/23:  Intervention:   Successful IVUS guided PCI with PAM to mid RCA  AUC score of 7  Implants:    STAN FRONTIER RX [id number]    FINDINGS:   Coronary Dominance:   Right  LM:   no disease   LAD:   proximal 80% lesion   mid moderate disease   distal mild disease   D1   mild disease   CX:   mild disease   RCA:   proximal mild disease   mid mild disease   distal 90% lesion      LVEDP: 17 mmHg       Pre cath note:    indication:  [ ] STEMI                [ ] NSTEMI                 [ ] Acute coronary syndrome                     [ ]Unstable Angina   [ ] high risk  [ ] intermediate risk  [ ] low risk                     [x ] Stable Angina     non-invasive testing:      CCTA     Date:   11/2023           result: [ ] high risk  [ x] intermediate risk  [ ] low risk    Anti- Anginal medications:                    [ ] not used                       [x ] used :     BB               [ ] not used but strong indication not to use    Ejection Fraction                   [ ] <29            [ ] 30-39%   [ ] 40-49%     [ x]>50%    CHF                   [ ] active (within last 14 days on meds   [ ] Chronic (on meds but no exacerbation)    COPD                   [ ] mild (on chronic bronchodilators)  [ ] moderate (on chronic steroid therapy)      [ ] severe (indication for home O2 or PACO2 >50)    Other risk factors:                       [ ] Previous MI                     [ ] CVA/ stroke                    [ ] carotid stent/ CEA                    [ ] PVD/PAD- (arterial aneurysm, non-palpable pulses, tortuous vessel with inability to insert catheter, infra-renal dissection, renal or subclavian artery stenosis)                    [ ] diabetic                    [ ] previous CABG                    [ ] Renal Failure        Right Jemal Test:     Adjusted Cath Bleeding Risk:     Pre-hydration:            57 y/o F with + family h/o CAD, PMHx of HTN, HLD, hypothyroidism, who initially presented to cardiologist with c/o CP with exertion and shortness of breath that has been occurring over the passed few weeks, had an abnormal CCTA with CAC 66.2, mLAD severe stenosis.  On 11/20/23 patient underwent LHC which revealed distal RCA with 90% lesion, s/p successful IVUS guided PCI with 1x PAM to mid RCA (STAN FRONTIER RX [id number]), with residual LAD dz.  Pt was on Nitro gtt post-cath for c/o chest discomfort, was monitored overnight, and discharged home the next day. Since intervention patient states chest burning has improved, but she is still experiencing shortness of breath when walking short distances.   Pt now presents for recommended staged PCI of LAD.     Left heart catheterization findings 11/20/23:  Intervention:   Successful IVUS guided PCI with PAM to mid RCA  AUC score of 7  Implants:    STAN FRONTIER RX 3.99Y35WX    FINDINGS:   Coronary Dominance:   Right  LM:   no disease   LAD:   proximal 80% lesion   mid moderate disease   distal mild disease   D1   mild disease   CX:   mild disease   RCA:   proximal mild disease   mid mild disease   distal 90% lesion      LVEDP: 17 mmHg       Pre cath note:    indication:  [ ] STEMI                [ ] NSTEMI                 [ ] Acute coronary syndrome                     [ ]Unstable Angina   [ ] high risk  [ ] intermediate risk  [ ] low risk                     [x ] Stable Angina     non-invasive testing:      CCTA     Date:   11/2023           result: [ ] high risk  [ x] intermediate risk  [ ] low risk    Anti- Anginal medications:                    [ ] not used                       [x ] used :     BB               [ ] not used but strong indication not to use  - Amlodipine 2.5 mg po given precath    Ejection Fraction                   [ ] <29            [ ] 30-39%   [ ] 40-49%     [ x]>50%    CHF                   [ ] active (within last 14 days on meds   [ ] Chronic (on meds but no exacerbation)    COPD                   [ ] mild (on chronic bronchodilators)  [ ] moderate (on chronic steroid therapy)      [ ] severe (indication for home O2 or PACO2 >50)    Other risk factors:                       [ ] Previous MI                     [ ] CVA/ stroke                    [ ] carotid stent/ CEA                    [ ] PVD/PAD- (arterial aneurysm, non-palpable pulses, tortuous vessel with inability to insert catheter, infra-renal dissection, renal or subclavian artery stenosis)                    [ ] diabetic                    [ ] previous CABG                    [ ] Renal Failure        Right Jemal Test: positive    Adjusted Cath Bleeding Risk: 1.3%    Pre-hydration:  ml bolus x 1 hour prior to cardiac cath           59 y/o F with + family h/o CAD, PMHx of HTN, HLD, hypothyroidism, who initially presented to cardiologist with c/o CP with exertion and shortness of breath that has been occurring over the passed few weeks, had an abnormal CCTA with CAC 66.2, mLAD severe stenosis.  On 11/20/23 patient underwent LHC which revealed distal RCA with 90% lesion, s/p successful IVUS guided PCI with 1x PAM to mid RCA (STAN FRONTIER RX [id number]), with residual LAD dz.  Pt was on Nitro gtt post-cath for c/o chest discomfort, was monitored overnight, and discharged home the next day. Since intervention patient states chest burning has improved, but she is still experiencing shortness of breath when walking short distances.   Pt now presents for recommended staged PCI of LAD.     Left heart catheterization findings 11/20/23:  Intervention:   Successful IVUS guided PCI with PAM to mid RCA  AUC score of 7  Implants:    STAN FRONTIER RX [id number]    FINDINGS:   Coronary Dominance:   Right  LM:   no disease   LAD:   proximal 80% lesion   mid moderate disease   distal mild disease   D1   mild disease   CX:   mild disease   RCA:   proximal mild disease   mid mild disease   distal 90% lesion      LVEDP: 17 mmHg       Pre cath note:    indication:  [ ] STEMI                [ ] NSTEMI                 [ ] Acute coronary syndrome                     [ ]Unstable Angina   [ ] high risk  [ ] intermediate risk  [ ] low risk                     [x ] Stable Angina     non-invasive testing:      CCTA     Date:   11/2023           result: [ ] high risk  [ x] intermediate risk  [ ] low risk    Anti- Anginal medications:                    [ ] not used                       [x ] used :     BB               [ ] not used but strong indication not to use  - Amlodipine 2.5 mg po given precath    Ejection Fraction                   [ ] <29            [ ] 30-39%   [ ] 40-49%     [ x]>50%    CHF                   [ ] active (within last 14 days on meds   [ ] Chronic (on meds but no exacerbation)    COPD                   [ ] mild (on chronic bronchodilators)  [ ] moderate (on chronic steroid therapy)      [ ] severe (indication for home O2 or PACO2 >50)    Other risk factors:                       [ ] Previous MI                     [ ] CVA/ stroke                    [ ] carotid stent/ CEA                    [ ] PVD/PAD- (arterial aneurysm, non-palpable pulses, tortuous vessel with inability to insert catheter, infra-renal dissection, renal or subclavian artery stenosis)                    [ ] diabetic                    [ ] previous CABG                    [ ] Renal Failure        Right Jemal Test: positive    Adjusted Cath Bleeding Risk: 1.3%    Pre-hydration:  ml bolus x 1 hour prior to cardiac cath

## 2023-12-27 ENCOUNTER — TRANSCRIPTION ENCOUNTER (OUTPATIENT)
Age: 58
End: 2023-12-27

## 2023-12-27 ENCOUNTER — OUTPATIENT (OUTPATIENT)
Dept: OUTPATIENT SERVICES | Facility: HOSPITAL | Age: 58
LOS: 1 days | Discharge: HOME | End: 2023-12-27
Payer: COMMERCIAL

## 2023-12-27 VITALS
SYSTOLIC BLOOD PRESSURE: 128 MMHG | DIASTOLIC BLOOD PRESSURE: 58 MMHG | OXYGEN SATURATION: 99 % | RESPIRATION RATE: 17 BRPM | HEART RATE: 74 BPM

## 2023-12-27 DIAGNOSIS — Z98.890 OTHER SPECIFIED POSTPROCEDURAL STATES: Chronic | ICD-10-CM

## 2023-12-27 DIAGNOSIS — I25.10 ATHEROSCLEROTIC HEART DISEASE OF NATIVE CORONARY ARTERY WITHOUT ANGINA PECTORIS: ICD-10-CM

## 2023-12-27 DIAGNOSIS — R07.9 CHEST PAIN, UNSPECIFIED: ICD-10-CM

## 2023-12-27 LAB
ANION GAP SERPL CALC-SCNC: 12 MMOL/L — SIGNIFICANT CHANGE UP (ref 7–14)
ANION GAP SERPL CALC-SCNC: 12 MMOL/L — SIGNIFICANT CHANGE UP (ref 7–14)
BUN SERPL-MCNC: 18 MG/DL — SIGNIFICANT CHANGE UP (ref 10–20)
BUN SERPL-MCNC: 18 MG/DL — SIGNIFICANT CHANGE UP (ref 10–20)
CALCIUM SERPL-MCNC: 9.4 MG/DL — SIGNIFICANT CHANGE UP (ref 8.4–10.4)
CALCIUM SERPL-MCNC: 9.4 MG/DL — SIGNIFICANT CHANGE UP (ref 8.4–10.4)
CHLORIDE SERPL-SCNC: 108 MMOL/L — SIGNIFICANT CHANGE UP (ref 98–110)
CHLORIDE SERPL-SCNC: 108 MMOL/L — SIGNIFICANT CHANGE UP (ref 98–110)
CO2 SERPL-SCNC: 24 MMOL/L — SIGNIFICANT CHANGE UP (ref 17–32)
CO2 SERPL-SCNC: 24 MMOL/L — SIGNIFICANT CHANGE UP (ref 17–32)
CREAT SERPL-MCNC: 0.8 MG/DL — SIGNIFICANT CHANGE UP (ref 0.7–1.5)
CREAT SERPL-MCNC: 0.8 MG/DL — SIGNIFICANT CHANGE UP (ref 0.7–1.5)
EGFR: 84 ML/MIN/1.73M2 — SIGNIFICANT CHANGE UP
EGFR: 84 ML/MIN/1.73M2 — SIGNIFICANT CHANGE UP
GLUCOSE SERPL-MCNC: 105 MG/DL — HIGH (ref 70–99)
GLUCOSE SERPL-MCNC: 105 MG/DL — HIGH (ref 70–99)
HCT VFR BLD CALC: 38.6 % — SIGNIFICANT CHANGE UP (ref 37–47)
HCT VFR BLD CALC: 38.6 % — SIGNIFICANT CHANGE UP (ref 37–47)
HCT VFR BLD CALC: 40.1 % — SIGNIFICANT CHANGE UP (ref 37–47)
HCT VFR BLD CALC: 40.1 % — SIGNIFICANT CHANGE UP (ref 37–47)
HGB BLD-MCNC: 12.9 G/DL — SIGNIFICANT CHANGE UP (ref 12–16)
HGB BLD-MCNC: 12.9 G/DL — SIGNIFICANT CHANGE UP (ref 12–16)
HGB BLD-MCNC: 13.4 G/DL — SIGNIFICANT CHANGE UP (ref 12–16)
HGB BLD-MCNC: 13.4 G/DL — SIGNIFICANT CHANGE UP (ref 12–16)
MCHC RBC-ENTMCNC: 30 PG — SIGNIFICANT CHANGE UP (ref 27–31)
MCHC RBC-ENTMCNC: 30 PG — SIGNIFICANT CHANGE UP (ref 27–31)
MCHC RBC-ENTMCNC: 30.1 PG — SIGNIFICANT CHANGE UP (ref 27–31)
MCHC RBC-ENTMCNC: 30.1 PG — SIGNIFICANT CHANGE UP (ref 27–31)
MCHC RBC-ENTMCNC: 33.4 G/DL — SIGNIFICANT CHANGE UP (ref 32–37)
MCV RBC AUTO: 89.7 FL — SIGNIFICANT CHANGE UP (ref 81–99)
MCV RBC AUTO: 89.7 FL — SIGNIFICANT CHANGE UP (ref 81–99)
MCV RBC AUTO: 90.2 FL — SIGNIFICANT CHANGE UP (ref 81–99)
MCV RBC AUTO: 90.2 FL — SIGNIFICANT CHANGE UP (ref 81–99)
NRBC # BLD: 0 /100 WBCS — SIGNIFICANT CHANGE UP (ref 0–0)
PLATELET # BLD AUTO: 223 K/UL — SIGNIFICANT CHANGE UP (ref 130–400)
PLATELET # BLD AUTO: 223 K/UL — SIGNIFICANT CHANGE UP (ref 130–400)
PLATELET # BLD AUTO: 250 K/UL — SIGNIFICANT CHANGE UP (ref 130–400)
PLATELET # BLD AUTO: 250 K/UL — SIGNIFICANT CHANGE UP (ref 130–400)
PMV BLD: 9.4 FL — SIGNIFICANT CHANGE UP (ref 7.4–10.4)
PMV BLD: 9.4 FL — SIGNIFICANT CHANGE UP (ref 7.4–10.4)
PMV BLD: 9.7 FL — SIGNIFICANT CHANGE UP (ref 7.4–10.4)
PMV BLD: 9.7 FL — SIGNIFICANT CHANGE UP (ref 7.4–10.4)
POTASSIUM SERPL-MCNC: 4.7 MMOL/L — SIGNIFICANT CHANGE UP (ref 3.5–5)
POTASSIUM SERPL-MCNC: 4.7 MMOL/L — SIGNIFICANT CHANGE UP (ref 3.5–5)
POTASSIUM SERPL-SCNC: 4.7 MMOL/L — SIGNIFICANT CHANGE UP (ref 3.5–5)
POTASSIUM SERPL-SCNC: 4.7 MMOL/L — SIGNIFICANT CHANGE UP (ref 3.5–5)
RBC # BLD: 4.28 M/UL — SIGNIFICANT CHANGE UP (ref 4.2–5.4)
RBC # BLD: 4.28 M/UL — SIGNIFICANT CHANGE UP (ref 4.2–5.4)
RBC # BLD: 4.47 M/UL — SIGNIFICANT CHANGE UP (ref 4.2–5.4)
RBC # BLD: 4.47 M/UL — SIGNIFICANT CHANGE UP (ref 4.2–5.4)
RBC # FLD: 12.6 % — SIGNIFICANT CHANGE UP (ref 11.5–14.5)
RBC # FLD: 12.6 % — SIGNIFICANT CHANGE UP (ref 11.5–14.5)
RBC # FLD: 12.8 % — SIGNIFICANT CHANGE UP (ref 11.5–14.5)
RBC # FLD: 12.8 % — SIGNIFICANT CHANGE UP (ref 11.5–14.5)
SODIUM SERPL-SCNC: 144 MMOL/L — SIGNIFICANT CHANGE UP (ref 135–146)
SODIUM SERPL-SCNC: 144 MMOL/L — SIGNIFICANT CHANGE UP (ref 135–146)
WBC # BLD: 6.77 K/UL — SIGNIFICANT CHANGE UP (ref 4.8–10.8)
WBC # BLD: 6.77 K/UL — SIGNIFICANT CHANGE UP (ref 4.8–10.8)
WBC # BLD: 9.87 K/UL — SIGNIFICANT CHANGE UP (ref 4.8–10.8)
WBC # BLD: 9.87 K/UL — SIGNIFICANT CHANGE UP (ref 4.8–10.8)
WBC # FLD AUTO: 6.77 K/UL — SIGNIFICANT CHANGE UP (ref 4.8–10.8)
WBC # FLD AUTO: 6.77 K/UL — SIGNIFICANT CHANGE UP (ref 4.8–10.8)
WBC # FLD AUTO: 9.87 K/UL — SIGNIFICANT CHANGE UP (ref 4.8–10.8)
WBC # FLD AUTO: 9.87 K/UL — SIGNIFICANT CHANGE UP (ref 4.8–10.8)

## 2023-12-27 PROCEDURE — 80048 BASIC METABOLIC PNL TOTAL CA: CPT

## 2023-12-27 PROCEDURE — C1725: CPT

## 2023-12-27 PROCEDURE — 85027 COMPLETE CBC AUTOMATED: CPT

## 2023-12-27 PROCEDURE — 92978 ENDOLUMINL IVUS OCT C 1ST: CPT | Mod: 26,LD

## 2023-12-27 PROCEDURE — C1753: CPT

## 2023-12-27 PROCEDURE — C1894: CPT

## 2023-12-27 PROCEDURE — C1769: CPT

## 2023-12-27 PROCEDURE — 92978 ENDOLUMINL IVUS OCT C 1ST: CPT | Mod: LD

## 2023-12-27 PROCEDURE — C9600: CPT | Mod: LD

## 2023-12-27 PROCEDURE — C1887: CPT

## 2023-12-27 PROCEDURE — 92928 PRQ TCAT PLMT NTRAC ST 1 LES: CPT | Mod: LD

## 2023-12-27 PROCEDURE — C1874: CPT

## 2023-12-27 PROCEDURE — 93458 L HRT ARTERY/VENTRICLE ANGIO: CPT | Mod: 26,XU

## 2023-12-27 PROCEDURE — 36415 COLL VENOUS BLD VENIPUNCTURE: CPT

## 2023-12-27 RX ORDER — NITROGLYCERIN 6.5 MG
25 CAPSULE, EXTENDED RELEASE ORAL
Qty: 50 | Refills: 0 | Status: DISCONTINUED | OUTPATIENT
Start: 2023-12-27 | End: 2023-12-27

## 2023-12-27 RX ORDER — SODIUM CHLORIDE 9 MG/ML
1000 INJECTION INTRAMUSCULAR; INTRAVENOUS; SUBCUTANEOUS
Refills: 0 | Status: DISCONTINUED | OUTPATIENT
Start: 2023-12-27 | End: 2023-12-27

## 2023-12-27 RX ORDER — CLOPIDOGREL BISULFATE 75 MG/1
225 TABLET, FILM COATED ORAL ONCE
Refills: 0 | Status: COMPLETED | OUTPATIENT
Start: 2023-12-27 | End: 2023-12-27

## 2023-12-27 RX ORDER — LEVOTHYROXINE SODIUM 125 MCG
1 TABLET ORAL
Refills: 0 | DISCHARGE

## 2023-12-27 RX ORDER — CHLORHEXIDINE GLUCONATE 213 G/1000ML
1 SOLUTION TOPICAL ONCE
Refills: 0 | Status: DISCONTINUED | OUTPATIENT
Start: 2023-12-27 | End: 2023-12-27

## 2023-12-27 RX ORDER — SODIUM CHLORIDE 9 MG/ML
250 INJECTION INTRAMUSCULAR; INTRAVENOUS; SUBCUTANEOUS ONCE
Refills: 0 | Status: COMPLETED | OUTPATIENT
Start: 2023-12-27 | End: 2023-12-27

## 2023-12-27 RX ORDER — ONDANSETRON 8 MG/1
4 TABLET, FILM COATED ORAL ONCE
Refills: 0 | Status: COMPLETED | OUTPATIENT
Start: 2023-12-27 | End: 2023-12-27

## 2023-12-27 RX ORDER — AMLODIPINE BESYLATE 2.5 MG/1
1 TABLET ORAL
Qty: 30 | Refills: 0
Start: 2023-12-27 | End: 2024-01-25

## 2023-12-27 RX ORDER — AMLODIPINE BESYLATE 2.5 MG/1
2.5 TABLET ORAL ONCE
Refills: 0 | Status: COMPLETED | OUTPATIENT
Start: 2023-12-27 | End: 2023-12-27

## 2023-12-27 RX ORDER — LISINOPRIL 2.5 MG/1
1 TABLET ORAL
Refills: 0 | DISCHARGE

## 2023-12-27 RX ADMIN — Medication 7.5 MICROGRAM(S)/MIN: at 10:26

## 2023-12-27 RX ADMIN — SODIUM CHLORIDE 150 MILLILITER(S): 9 INJECTION INTRAMUSCULAR; INTRAVENOUS; SUBCUTANEOUS at 10:27

## 2023-12-27 RX ADMIN — SODIUM CHLORIDE 250 MILLILITER(S): 9 INJECTION INTRAMUSCULAR; INTRAVENOUS; SUBCUTANEOUS at 07:57

## 2023-12-27 RX ADMIN — SODIUM CHLORIDE 150 MILLILITER(S): 9 INJECTION INTRAMUSCULAR; INTRAVENOUS; SUBCUTANEOUS at 10:00

## 2023-12-27 RX ADMIN — ONDANSETRON 4 MILLIGRAM(S): 8 TABLET, FILM COATED ORAL at 10:26

## 2023-12-27 RX ADMIN — CLOPIDOGREL BISULFATE 225 MILLIGRAM(S): 75 TABLET, FILM COATED ORAL at 08:12

## 2023-12-27 RX ADMIN — AMLODIPINE BESYLATE 2.5 MILLIGRAM(S): 2.5 TABLET ORAL at 07:57

## 2023-12-27 NOTE — ASU DISCHARGE PLAN (ADULT/PEDIATRIC) - CARE PROVIDER_API CALL
Venkatesh Vo  Cardiology  [address] [place] [id number]  Phone: [phone]  Fax: [phone]  Follow Up Time: 2 weeks   Venkatesh Vo  Cardiology  [address] [place] [id number]  Phone: [phone]  Fax: [phone]  Follow Up Time: 2 weeks

## 2023-12-27 NOTE — ASU DISCHARGE PLAN (ADULT/PEDIATRIC) - ASU DC SPECIAL INSTRUCTIONSFT
Discharge Instructions as follows:  - Continue medical regimen as prescribed to prevent chest pain.  - If you are diabetic and taking medication containing Metformin, do not take them for 48 hours after the procedure  - Continue dual anti-platelet therapy, beta blocker, Statin, Lisinopril, Amlodipine   - Instructed to call 911 if chest pain, shortness of breath or bleeding from access site.  - No heavy lifting > 10lbs x 1 week.  - No driving x 24 hours.  - No baths, swimming pools x 1 week, may shower  - Low sodium low fat low cholesterol diet  - Follow-up with Cardiologist in 1-2 weeks after discharge  - Soreness or tenderness at the site is possible, it will diminish over time. You may take Tylenol every 4-6 hours as needed. Nothing stronger is needed. NO Motrin/Ibuprofen  - Any questions call cardiac cath lab [phone] Discharge Instructions as follows:  - Continue medical regimen as prescribed to prevent chest pain.  - If you are diabetic and taking medication containing Metformin, do not take them for 48 hours after the procedure  - Continue dual anti-platelet therapy, beta blocker, Statin, Lisinopril, Amlodipine   - Instructed to call 911 if chest pain, shortness of breath or bleeding from access site.  - No heavy lifting > 10lbs x 1 week.  - No driving x 24 hours.  - No baths, swimming pools x 1 week, may shower  - Low sodium low fat low cholesterol diet  - Follow-up with Cardiologist in 1-2 weeks after discharge  - Soreness or tenderness at the site is possible, it will diminish over time. You may take Tylenol every 4-6 hours as needed. Nothing stronger is needed. NO Motrin/Ibuprofen  - Any questions call cardiac cath lab [phone]

## 2023-12-27 NOTE — ASU PATIENT PROFILE, ADULT - FALL HARM RISK - UNIVERSAL INTERVENTIONS
Bed in lowest position, wheels locked, appropriate side rails in place/Call bell, personal items and telephone in reach/Instruct patient to call for assistance before getting out of bed or chair/Non-slip footwear when patient is out of bed/[place] to call system/Physically safe environment - no spills, clutter or unnecessary equipment/Purposeful Proactive Rounding/Room/bathroom lighting operational, light cord in reach Bed in lowest position, wheels locked, appropriate side rails in place/Call bell, personal items and telephone in reach/Instruct patient to call for assistance before getting out of bed or chair/Non-slip footwear when patient is out of bed/[place] to call system/Physically safe environment - no spills, clutter or unnecessary equipment/Purposeful Proactive Rounding/Room/bathroom lighting operational, light cord in reach

## 2023-12-27 NOTE — PROGRESS NOTE ADULT - SUBJECTIVE AND OBJECTIVE BOX
Cardiology Follow up    BLAINE LOCKE   58y Female  PAST MEDICAL & SURGICAL HISTORY:  HTN (hypertension)      HLD (hyperlipidemia)      Hypothyroidism      CAD (coronary artery disease)      History of percutaneous angioplasty           HPI:  57 y/o F with + family h/o CAD, PMHx of HTN, HLD, hypothyroidism, who initially presented to cardiologist with c/o CP with exertion and shortness of breath that has been occurring over the passed few weeks, had an abnormal CCTA with CAC 66.2, mLAD severe stenosis.  On 11/20/23 patient underwent LHC which revealed distal RCA with 90% lesion, s/p successful IVUS guided PCI with 1x PAM to mid RCA ([employer] RX [id number]), with residual LAD dz.  Pt was on Nitro gtt post-cath for c/o chest discomfort, was monitored overnight, and discharged home the next day. Since intervention patient states chest burning has improved, but she is still experiencing shortness of breath when walking short distances.   Pt now presents for recommended staged PCI of LAD.     Left heart catheterization findings 11/20/23:  Intervention:   Successful IVUS guided PCI with PAM to mid RCA  AUC score of 7  Implants:    STAN FRONTIER RX [id number]    FINDINGS:   Coronary Dominance:   Right  LM:   no disease   LAD:   proximal 80% lesion   mid moderate disease   distal mild disease   D1   mild disease   CX:   mild disease   RCA:   proximal mild disease   mid mild disease   distal 90% lesion      LVEDP: 17 mmHg       Pre cath note:    indication:  [ ] STEMI                [ ] NSTEMI                 [ ] Acute coronary syndrome                     [ ]Unstable Angina   [ ] high risk  [ ] intermediate risk  [ ] low risk                     [x ] Stable Angina     non-invasive testing:      CCTA     Date:   11/2023           result: [ ] high risk  [ x] intermediate risk  [ ] low risk    Anti- Anginal medications:                    [ ] not used                       [x ] used :     BB               [ ] not used but strong indication not to use  - Amlodipine 2.5 mg po given precath    Ejection Fraction                   [ ] <29            [ ] 30-39%   [ ] 40-49%     [ x]>50%    CHF                   [ ] active (within last 14 days on meds   [ ] Chronic (on meds but no exacerbation)    COPD                   [ ] mild (on chronic bronchodilators)  [ ] moderate (on chronic steroid therapy)      [ ] severe (indication for home O2 or PACO2 >50)    Other risk factors:                       [ ] Previous MI                     [ ] CVA/ stroke                    [ ] carotid stent/ CEA                    [ ] PVD/PAD- (arterial aneurysm, non-palpable pulses, tortuous vessel with inability to insert catheter, infra-renal dissection, renal or subclavian artery stenosis)                    [ ] diabetic                    [ ] previous CABG                    [ ] Renal Failure        Right Jemal Test: positive    Adjusted Cath Bleeding Risk: 1.3%    Pre-hydration:  ml bolus x 1 hour prior to cardiac cath           (26 Dec 2023 11:21)    Allergies    doxycycline (Rash)    Intolerances      Patient seen and examined at bedside.  Patient without complaints.   Denies CP, SOB, palpitations, or dizziness    Vital Signs Last   HR: 57  BP: 128/56  SpO2: 98% on RA    Parameters below as of 26 Dec 2023 11:21  Patient On (Oxygen Delivery Method): room air        MEDICATIONS  (STANDING):  chlorhexidine 4% Liquid 1 Application(s) Topical once  sodium chloride 0.9%. 1000 milliLiter(s) (150 mL/Hr) IV Continuous <Continuous>    MEDICATIONS  (PRN):      REVIEW OF SYSTEMS:          All negative except as mentioned in HPI    PHYSICAL EXAM:           CONSTITUTIONAL: Well-developed; well-nourished; in no acute distress  	SKIN: warm, dry  	HEAD: Normocephalic; atraumatic  	EYES: PERRL.  	ENT: No nasal discharge, airway clear, mucous membranes moist  	NECK: Supple; non tender.  	CARD: +S1, +S2, no murmurs, gallops, or rubs. Regular rate and rhythm    	RESP: No wheezes, rales or rhonchi. CTA B/L  	ABD: soft ntnd, + BS x 4 quadrants  	EXT: moves all extremities,  no clubbing, cyanosis or edema  	NEURO: Alert and oriented x3, no focal deficits          PSYCH: Cooperative, appropriate          VASCULAR:  + Rad / + PTs / + DPs          EXTREMITY:               Right Radial: Dressing D/C/I, pressure dressing removed, access site soft, no hematoma, no pain, + pulses, no sign of infection, no numbness            ECG: pending    LABS:                        13.4   6.77  )-----------( 250      ( 27 Dec 2023 07:45 )             40.1     12-27    144  |  108  |  18  ----------------------------<  105<H>  4.7   |  24  |  0.8    Ca    9.4      27 Dec 2023 07:45    A/P:  I discussed the case with Cardiologist Dr. Ricardo  and recommend the following:    S/P PCI:    Staged PAM x 1 pLAD    	     Continue DAPT ( Aspirin 81 mg PO Daily and Plavix 75mg po daily ),  B-Blocker, Statin Therapy, Lisinopril, Famotidine                   Patient given 30 day supply of ( Aspirin 81 mg daily and Plavix 75 mg daily ) to take at home                   START Amlodipine 2.5mg po daily                   CBC at 1330                    EKG prior to d/c at 1330                   NS at 150 ml/hr x 6hrs                   OOB Ambulate                    Monitor access site/ distal pulses                   Patient agreeing to take DAPT for at least one year or as directed by cardiologist                    Pt given instructions on importance of taking antiplatelet medication or risk acute stent thrombosis/death                   Post cath instructions, access site care and activity restrictions reviewed with patient                     Discussed with patient to return to hospital if experience chest pain, shortness breath, dizziness and site bleeding                   Aggressive risk factor modification, diet counseling, smoking cessation discussed with patient                    Benefits of cardiac rehab discussed with patient                    Cardiac rehab info provided/referral and communication to cardiac rehab completed                   Patient instructed to call cardiac rehab and make initial appointment after first follow-up visit with Cardiologist                    Can discharge patient at 1530 from cardiac standpoint after ambulating without symptoms, access site wnl, labs and ECG reviewed                    Follow up with Cardiology Dr. Vo in two weeks.  Instructed to call and make an appointment                                       Cardiology Follow up    BLAINE LOCKE   58y Female  PAST MEDICAL & SURGICAL HISTORY:  HTN (hypertension)      HLD (hyperlipidemia)      Hypothyroidism      CAD (coronary artery disease)      History of percutaneous angioplasty           HPI:  59 y/o F with + family h/o CAD, PMHx of HTN, HLD, hypothyroidism, who initially presented to cardiologist with c/o CP with exertion and shortness of breath that has been occurring over the passed few weeks, had an abnormal CCTA with CAC 66.2, mLAD severe stenosis.  On 11/20/23 patient underwent LHC which revealed distal RCA with 90% lesion, s/p successful IVUS guided PCI with 1x PAM to mid RCA (STAN FRONTIER RX [id number]), with residual LAD dz.  Pt was on Nitro gtt post-cath for c/o chest discomfort, was monitored overnight, and discharged home the next day. Since intervention patient states chest burning has improved, but she is still experiencing shortness of breath when walking short distances.   Pt now presents for recommended staged PCI of LAD.     Left heart catheterization findings 11/20/23:  Intervention:   Successful IVUS guided PCI with PAM to mid RCA  AUC score of 7  Implants:    STAN FRONTIER RX [id number]    FINDINGS:   Coronary Dominance:   Right  LM:   no disease   LAD:   proximal 80% lesion   mid moderate disease   distal mild disease   D1   mild disease   CX:   mild disease   RCA:   proximal mild disease   mid mild disease   distal 90% lesion      LVEDP: 17 mmHg       Pre cath note:    indication:  [ ] STEMI                [ ] NSTEMI                 [ ] Acute coronary syndrome                     [ ]Unstable Angina   [ ] high risk  [ ] intermediate risk  [ ] low risk                     [x ] Stable Angina     non-invasive testing:      CCTA     Date:   11/2023           result: [ ] high risk  [ x] intermediate risk  [ ] low risk    Anti- Anginal medications:                    [ ] not used                       [x ] used :     BB               [ ] not used but strong indication not to use  - Amlodipine 2.5 mg po given precath    Ejection Fraction                   [ ] <29            [ ] 30-39%   [ ] 40-49%     [ x]>50%    CHF                   [ ] active (within last 14 days on meds   [ ] Chronic (on meds but no exacerbation)    COPD                   [ ] mild (on chronic bronchodilators)  [ ] moderate (on chronic steroid therapy)      [ ] severe (indication for home O2 or PACO2 >50)    Other risk factors:                       [ ] Previous MI                     [ ] CVA/ stroke                    [ ] carotid stent/ CEA                    [ ] PVD/PAD- (arterial aneurysm, non-palpable pulses, tortuous vessel with inability to insert catheter, infra-renal dissection, renal or subclavian artery stenosis)                    [ ] diabetic                    [ ] previous CABG                    [ ] Renal Failure        Right Jemal Test: positive    Adjusted Cath Bleeding Risk: 1.3%    Pre-hydration:  ml bolus x 1 hour prior to cardiac cath           (26 Dec 2023 11:21)    Allergies    doxycycline (Rash)    Intolerances      Patient seen and examined at bedside.  Patient without complaints.   Denies CP, SOB, palpitations, or dizziness    Vital Signs Last   HR: 57  BP: 128/56  SpO2: 98% on RA    Parameters below as of 26 Dec 2023 11:21  Patient On (Oxygen Delivery Method): room air        MEDICATIONS  (STANDING):  chlorhexidine 4% Liquid 1 Application(s) Topical once  sodium chloride 0.9%. 1000 milliLiter(s) (150 mL/Hr) IV Continuous <Continuous>    MEDICATIONS  (PRN):      REVIEW OF SYSTEMS:          All negative except as mentioned in HPI    PHYSICAL EXAM:           CONSTITUTIONAL: Well-developed; well-nourished; in no acute distress  	SKIN: warm, dry  	HEAD: Normocephalic; atraumatic  	EYES: PERRL.  	ENT: No nasal discharge, airway clear, mucous membranes moist  	NECK: Supple; non tender.  	CARD: +S1, +S2, no murmurs, gallops, or rubs. Regular rate and rhythm    	RESP: No wheezes, rales or rhonchi. CTA B/L  	ABD: soft ntnd, + BS x 4 quadrants  	EXT: moves all extremities,  no clubbing, cyanosis or edema  	NEURO: Alert and oriented x3, no focal deficits          PSYCH: Cooperative, appropriate          VASCULAR:  + Rad / + PTs / + DPs          EXTREMITY:               Right Radial: Dressing D/C/I, pressure dressing removed, access site soft, no hematoma, no pain, + pulses, no sign of infection, no numbness            ECG: pending    LABS:                        13.4   6.77  )-----------( 250      ( 27 Dec 2023 07:45 )             40.1     12-27    144  |  108  |  18  ----------------------------<  105<H>  4.7   |  24  |  0.8    Ca    9.4      27 Dec 2023 07:45    A/P:  I discussed the case with Cardiologist Dr. Ricardo  and recommend the following:    S/P PCI:    Staged PAM x 1 pLAD    	     Continue DAPT ( Aspirin 81 mg PO Daily and Plavix 75mg po daily ),  B-Blocker, Statin Therapy, Lisinopril, Famotidine                   Patient given 30 day supply of ( Aspirin 81 mg daily and Plavix 75 mg daily ) to take at home                   START Amlodipine 2.5mg po daily                   CBC at 1330                    EKG prior to d/c at 1330                   NS at 150 ml/hr x 6hrs                   OOB Ambulate                    Monitor access site/ distal pulses                   Patient agreeing to take DAPT for at least one year or as directed by cardiologist                    Pt given instructions on importance of taking antiplatelet medication or risk acute stent thrombosis/death                   Post cath instructions, access site care and activity restrictions reviewed with patient                     Discussed with patient to return to hospital if experience chest pain, shortness breath, dizziness and site bleeding                   Aggressive risk factor modification, diet counseling, smoking cessation discussed with patient                    Benefits of cardiac rehab discussed with patient                    Cardiac rehab info provided/referral and communication to cardiac rehab completed                   Patient instructed to call cardiac rehab and make initial appointment after first follow-up visit with Cardiologist                    Can discharge patient at 1530 from cardiac standpoint after ambulating without symptoms, access site wnl, labs and ECG reviewed                    Follow up with Cardiology Dr. Vo in two weeks.  Instructed to call and make an appointment

## 2023-12-27 NOTE — CHART NOTE - NSCHARTNOTEFT_GEN_A_CORE
PRE-OP DIAGNOSIS:    Stage PCI of LAD     PROCEDURE:     [] Coronary Angiogram     [x] LHC     [] LVG     [] RHC     [] Intervention (see below)         PHYSICIAN:  Dr. Ricardo    ASSISTANT:  Dr. Rod       PROCEDURE DESCRIPTION:     Consent:      [x] Patient     [] Family Member     []  Used        Anesthesia:     [] General     [x] Sedation     [x] Local        Access & Closure:     [x] 6Fr Radial Artery , Vascband    [] Fr Femoral Artery     [] Fr Femoral Vein     [] Fr Brachial Vein       IV Contrast: 150mL        Intervention:   PCI of P-LAD    Implants:    Xience 3.5x 15  IVUS  FINDINGS:     Coronary Dominance: Right      LM: Minor luminal irregularities.     LAD:   P-LAD 90 percent disease s/p PCI   Mid-LAD 60 percent stenosis   Distal LAD Mild disease.   D1: Mild disease.     CX:   P-LCX 40 percent disease   Distal LCX Mild disease.   OM1 Mild disease.  OM2 Mild disease.     RCA:   Prior stent patent in mid-RCA  P-rca Mild disease.   Distal RCA Mild disease.   RPDA Mild disease.        LVEDP: 17mmHg      ESTIMATED BLOOD LOSS: < 10 mL        CONDITION:     [x] Good     [] Fair     [] Critical        SPECIMEN REMOVED: N/A       POST-OP DIAGNOSIS:      90 percent stenosis of P-LAD s/p PCI with PAM.     PLAN OF CARE:     [x] D/C Home Today , if patient chest improves and off from nitro drip    [] Return to In-patient bed     [] Admit for observation     [] Return for Staged Procedure     [] CT Surgery Consult     [] Medications: ASA, Plavix, Lipitor 80 mg     [] IV Fluids: NS at 150 ml/hr for 6 hours.    250 cc IV fluid daily 3 days post cath   Encourage oral hydration. PRE-OP DIAGNOSIS:    Stage PCI of LAD     PROCEDURE:     [] Coronary Angiogram     [x] LHC     [] LVG     [] RHC     [] Intervention (see below)         PHYSICIAN:  Dr. Ricardo    ASSISTANT:  Dr. Rod       PROCEDURE DESCRIPTION:     Consent:      [x] Patient     [] Family Member     []  Used        Anesthesia:     [] General     [x] Sedation     [x] Local        Access & Closure:     [x] 6Fr Radial Artery , Vascband    [] Fr Femoral Artery     [] Fr Femoral Vein     [] Fr Brachial Vein       IV Contrast: 150mL        Intervention:   PCI of P-LAD    Implants:    Xience 3.5x 15  IVUS  FINDINGS:     Coronary Dominance: Right      LM: Minor luminal irregularities.     LAD:   P-LAD 90 percent disease s/p PCI   Mid-LAD 60 percent stenosis   Distal LAD Mild disease.   D1: Mild disease.     CX:   P-LCX 40 percent disease   Distal LCX Mild disease.   OM1 Mild disease.  OM2 Mild disease.     RCA:   Prior stent patent in mid-RCA  P-rca Mild disease.   Distal RCA Mild disease.   RPDA Mild disease.        LVEDP: 17mmHg      ESTIMATED BLOOD LOSS: < 10 mL        CONDITION:     [x] Good     [] Fair     [] Critical        SPECIMEN REMOVED: N/A       POST-OP DIAGNOSIS:      90% stenosis of P-LAD s/p PCI with PAM.     PLAN OF CARE:     [x] D/C Home Today , if patient chest improves and off from nitro drip    [] Return to In-patient bed     [] Admit for observation     [] Return for Staged Procedure     [] CT Surgery Consult     [] Medications: ASA, Plavix, Lipitor 80 mg     [] IV Fluids: NS at 150 ml/hr for 6 hours.    250 cc IV fluid daily 3 days post cath   Encourage oral hydration.

## 2023-12-27 NOTE — ASU PATIENT PROFILE, ADULT - NSICDXPASTMEDICALHX_GEN_ALL_CORE_FT
24-Feb-2017 PAST MEDICAL HISTORY:  CAD (coronary artery disease)     HLD (hyperlipidemia)     HTN (hypertension)     Hypothyroidism

## 2024-01-02 DIAGNOSIS — I25.118 ATHEROSCLEROTIC HEART DISEASE OF NATIVE CORONARY ARTERY WITH OTHER FORMS OF ANGINA PECTORIS: ICD-10-CM

## 2024-01-02 DIAGNOSIS — I10 ESSENTIAL (PRIMARY) HYPERTENSION: ICD-10-CM

## 2024-01-02 DIAGNOSIS — E78.5 HYPERLIPIDEMIA, UNSPECIFIED: ICD-10-CM

## 2024-01-02 DIAGNOSIS — Z95.5 PRESENCE OF CORONARY ANGIOPLASTY IMPLANT AND GRAFT: ICD-10-CM

## 2024-01-10 ENCOUNTER — APPOINTMENT (OUTPATIENT)
Dept: CARDIOLOGY | Facility: CLINIC | Age: 59
End: 2024-01-10
Payer: COMMERCIAL

## 2024-01-10 VITALS
BODY MASS INDEX: 39.75 KG/M2 | HEIGHT: 62 IN | DIASTOLIC BLOOD PRESSURE: 84 MMHG | WEIGHT: 216 LBS | HEART RATE: 73 BPM | SYSTOLIC BLOOD PRESSURE: 126 MMHG

## 2024-01-10 PROBLEM — I25.10 ATHEROSCLEROTIC HEART DISEASE OF NATIVE CORONARY ARTERY WITHOUT ANGINA PECTORIS: Chronic | Status: ACTIVE | Noted: 2023-12-26

## 2024-01-10 PROCEDURE — 99214 OFFICE O/P EST MOD 30 MIN: CPT

## 2024-01-10 PROCEDURE — 93000 ELECTROCARDIOGRAM COMPLETE: CPT

## 2024-01-10 NOTE — REASON FOR VISIT
[Other: ____] : [unfilled] [FreeTextEntry1] : Diagnostic Tests:  ----------------------- EK/10/24-NSR. TWF.  23-NSR. Normal EKG.  23-NSR. Normal EKG.  ----------------------- Echo:  10/03/23-TTE: EF 61%, no significant valvular disease.  ------------------------ CT:  23-CCTA: CAC 66.2. LM none. mLAD 75%, dLAD 50%, pLCx 20-30%, RCA none. Multiple pulmonary nodules (calcified).  ------------------------- Cath:  23- LHC, staged pLAD 23-LHC: LM none, pLAD 80%, mLAD moderate, dLAD mild, D1 mild, LCx mild, pRCA mild, mRCA mild, dRCA 95%. LVEDP 17 mmHg. S/P PCI to mdRCA with [place] RX 3.5x18 mm PAM.

## 2024-01-10 NOTE — HISTORY OF PRESENT ILLNESS
[FreeTextEntry1] : Ms. Lake is a 57yo F with PMHx of CAD s/p PCI, HTN, HLD, hypothyroid who presents for follow up. Her PMD Is Dr. Stanley Soler. She has been having chest burning with exertion along with SOB. For last few weeks. She had stress test many years ago. She is able to walk about 0.5 miles before symptoms occur. She can walk up 1-2 flights of stairs without symptoms. She will get some skipped beats at night.  11/08/23-Patient feels the same. Pending CCTA next week.  12/11/23-Pt had CCTA which showed severe LAD disease. She went for LHC and found to have severe dRCA disease s/p PAM. She is pending staged PCI for LAD disease. She is feeling better. She still gets some SOB with ambulation but improved.  01/10/24: Patient is s/p staged PCI of pLAD on 12/28/2023, RCA stent was found to be patent. She is somewhat forgetful.

## 2024-01-10 NOTE — ASSESSMENT
[FreeTextEntry1] : CAD: 2VD. pLAD and dRCA -S/P PAM to dRCA and pLAD.  -Continue with ASA, Plavix, atorvastatin, lisinopril 20mg, amlodipine 2.5mg and Toprol.   HTN: BP at goal per ACC/AHA 2018 guidelines -Continue with lisinopril 20mg PO daily.  HLD: -Continue atorvastatin 40mg PO daily.   Hypothyroid: -Continue with levothyroxine.  Palpitations: -If continuing, will consider MCOT.  Pulmonary nodules:  -Pulm follow up.   Thyroid nodule:  -Endocrine referral.   Follow up in 4 months.

## 2024-01-20 ENCOUNTER — EMERGENCY (EMERGENCY)
Facility: HOSPITAL | Age: 59
LOS: 0 days | Discharge: HOME | End: 2024-01-20
Attending: EMERGENCY MEDICINE
Payer: COMMERCIAL

## 2024-01-20 VITALS
WEIGHT: 212.97 LBS | HEART RATE: 82 BPM | OXYGEN SATURATION: 98 % | TEMPERATURE: 98 F | HEIGHT: 62 IN | DIASTOLIC BLOOD PRESSURE: 81 MMHG | SYSTOLIC BLOOD PRESSURE: 148 MMHG | RESPIRATION RATE: 18 BRPM

## 2024-01-20 DIAGNOSIS — S61.212A LACERATION WITHOUT FOREIGN BODY OF RIGHT MIDDLE FINGER WITHOUT DAMAGE TO NAIL, INITIAL ENCOUNTER: ICD-10-CM

## 2024-01-20 DIAGNOSIS — E78.5 HYPERLIPIDEMIA, UNSPECIFIED: ICD-10-CM

## 2024-01-20 DIAGNOSIS — Z98.890 OTHER SPECIFIED POSTPROCEDURAL STATES: Chronic | ICD-10-CM
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
## 2024-01-20 DIAGNOSIS — Z23 ENCOUNTER FOR IMMUNIZATION: ICD-10-CM
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
## 2024-01-20 DIAGNOSIS — I10 ESSENTIAL (PRIMARY) HYPERTENSION: ICD-10-CM

## 2024-01-20 DIAGNOSIS — I25.10 ATHEROSCLEROTIC HEART DISEASE OF NATIVE CORONARY ARTERY WITHOUT ANGINA PECTORIS: ICD-10-CM

## 2024-01-20 DIAGNOSIS — Z88.1 ALLERGY STATUS TO OTHER ANTIBIOTIC AGENTS STATUS: ICD-10-CM

## 2024-01-20 PROCEDURE — 12001 RPR S/N/AX/GEN/TRNK 2.5CM/<: CPT

## 2024-01-20 PROCEDURE — 99283 EMERGENCY DEPT VISIT LOW MDM: CPT | Mod: 25

## 2024-01-20 PROCEDURE — 90471 IMMUNIZATION ADMIN: CPT

## 2024-01-20 PROCEDURE — 90715 TDAP VACCINE 7 YRS/> IM: CPT

## 2024-01-20 PROCEDURE — 99284 EMERGENCY DEPT VISIT MOD MDM: CPT | Mod: 25

## 2024-01-20 RX ORDER — TETANUS TOXOID, REDUCED DIPHTHERIA TOXOID AND ACELLULAR PERTUSSIS VACCINE, ADSORBED 5; 2.5; 8; 8; 2.5 [IU]/.5ML; [IU]/.5ML; UG/.5ML; UG/.5ML; UG/.5ML
0.5 SUSPENSION INTRAMUSCULAR ONCE
Refills: 0 | Status: COMPLETED | OUTPATIENT
Start: 2024-01-20 | End: 2024-01-20

## 2024-01-20 RX ADMIN — TETANUS TOXOID, REDUCED DIPHTHERIA TOXOID AND ACELLULAR PERTUSSIS VACCINE, ADSORBED 0.5 MILLILITER(S): 5; 2.5; 8; 8; 2.5 SUSPENSION INTRAMUSCULAR at 15:40

## 2024-01-20 NOTE — ED PROVIDER NOTE - OBJECTIVE STATEMENT
59 yo female, pmh of cad, hld, htn, p/w lac to right 3rd digit, occurred today cleaning knife, Denies numbness, tingling. + bleeding from site. unsure of td status.

## 2024-01-20 NOTE — ED PROVIDER NOTE - PATIENT PORTAL LINK FT
You can access the FollowMyHealth Patient Portal offered by [facility] by registering at the following website: [email or web address]. By joining [facility]’s FollowMyHealth portal, you will also be able to view your health information using other applications (apps) compatible with our system.

## 2024-01-20 NOTE — ED PROVIDER NOTE - IV ALTEPLASE INCLUSION HIDDEN
Nutrition Discharge Education    Patient Name: Yahir Tony  MRN: [id number]  Admission date: 2023    Education date: 06/10/23 12:55 EDT    Reason for visit: Discharge teaching for feeding plan    Discharge diet:  Neosure 22 laura/oz    Discharge instruction given to:  Parents of infants    Topics Covered During Discharge:  Spoke with parents about how to mix Neosure 22 laura/oz.  Instructed parents to start with clean hands, clean utensils, and clean surface.  Educated parents to mix according to the can.  Educated what type of water to use to mix the formula.  Instructed parents that the mixed formula is good in the refrigerator for 24 hours.  Educated on how to warm up refrigerated formula.  Educated on shelf life of powdered formula once the can is opened.    Completed [facility] forms given:  No    Written material given with contact name and phone number for further questions.      Nadiya Nieves, RD  12:55 EDT  Time Spent:  35 minutes   show

## 2024-01-20 NOTE — ED PROVIDER NOTE - NS ED ATTENDING STATEMENT MOD
dry, intact, no bleeding and no hematoma. Manual pressure applied to left IJ followed with Vaseline gauze with tegaderm dressing  This was a shared visit with the ESTHER. I reviewed and verified the documentation.

## 2024-01-20 NOTE — ED PROVIDER NOTE - PHYSICAL EXAMINATION
Physical Exam    Vital Signs: I have reviewed the initial vital signs.  Constitutional: appears stated age, no acute distress  Eyes: Conjunctiva pink, Sclera clear,   Musculoskeletal:  from of right digit   Integumentary: distal lac to right 3rd digit to palmar aspect note skin flap  Neurologic: awake, alert, nvi

## 2024-03-06 ENCOUNTER — APPOINTMENT (OUTPATIENT)
Dept: CARDIOLOGY | Facility: CLINIC | Age: 59
End: 2024-03-06

## 2024-04-30 ENCOUNTER — APPOINTMENT (OUTPATIENT)
Dept: ENDOCRINOLOGY | Facility: CLINIC | Age: 59
End: 2024-04-30
Payer: COMMERCIAL

## 2024-04-30 VITALS
BODY MASS INDEX: 39.38 KG/M2 | OXYGEN SATURATION: 96 % | TEMPERATURE: 97.5 F | SYSTOLIC BLOOD PRESSURE: 130 MMHG | HEIGHT: 62 IN | HEART RATE: 85 BPM | DIASTOLIC BLOOD PRESSURE: 70 MMHG | WEIGHT: 214 LBS

## 2024-04-30 DIAGNOSIS — Z86.79 PERSONAL HISTORY OF OTHER DISEASES OF THE CIRCULATORY SYSTEM: ICD-10-CM

## 2024-04-30 DIAGNOSIS — Z86.39 PERSONAL HISTORY OF OTHER ENDOCRINE, NUTRITIONAL AND METABOLIC DISEASE: ICD-10-CM

## 2024-04-30 DIAGNOSIS — Z80.1 FAMILY HISTORY OF MALIGNANT NEOPLASM OF TRACHEA, BRONCHUS AND LUNG: ICD-10-CM

## 2024-04-30 DIAGNOSIS — Z82.49 FAMILY HISTORY OF ISCHEMIC HEART DISEASE AND OTHER DISEASES OF THE CIRCULATORY SYSTEM: ICD-10-CM

## 2024-04-30 DIAGNOSIS — E03.9 HYPOTHYROIDISM, UNSPECIFIED: ICD-10-CM

## 2024-04-30 PROCEDURE — 99204 OFFICE O/P NEW MOD 45 MIN: CPT

## 2024-04-30 RX ORDER — AMLODIPINE BESYLATE 2.5 MG/1
2.5 TABLET ORAL
Refills: 0 | Status: COMPLETED | COMMUNITY
End: 2024-04-30

## 2024-04-30 NOTE — PHYSICAL EXAM
[Alert] : alert [Well Nourished] : well nourished [No Acute Distress] : no acute distress [Well Developed] : well developed [Normal Sclera/Conjunctiva] : normal sclera/conjunctiva [EOMI] : extra ocular movement intact [PERRL] : pupils equal, round and reactive to light [No Proptosis] : no proptosis [Normal Outer Ear/Nose] : the ears and nose were normal in appearance [Normal Hearing] : hearing was normal [Supple] : the neck was supple [Thyroid Not Enlarged] : the thyroid was not enlarged [No Respiratory Distress] : no respiratory distress [No Accessory Muscle Use] : no accessory muscle use [Normal Rate and Effort] : normal respiratory rate and effort [Clear to Auscultation] : lungs were clear to auscultation bilaterally [Normal S1, S2] : normal S1 and S2 [Normal Rate] : heart rate was normal [Regular Rhythm] : with a regular rhythm [Carotids Normal] : carotid pulses were normal with no bruits [No Edema] : no peripheral edema [Pedal Pulses Normal] : the pedal pulses are present [Normal Bowel Sounds] : normal bowel sounds [Not Tender] : non-tender [Not Distended] : not distended [Soft] : abdomen soft [Normal Anterior Cervical Nodes] : no anterior cervical lymphadenopathy [Normal Posterior Cervical Nodes] : no posterior cervical lymphadenopathy [No CVA Tenderness] : no ~M costovertebral angle tenderness [No Spinal Tenderness] : no spinal tenderness [Spine Straight] : spine straight [No Stigmata of Cushings Syndrome] : no stigmata of Cushings Syndrome [Normal Gait] : normal gait [Normal Strength/Tone] : muscle strength and tone were normal [No Rash] : no rash [No Motor Deficits] : the motor exam was normal [Normal Reflexes] : deep tendon reflexes were 2+ and symmetric [No Tremors] : no tremors [Oriented x3] : oriented to person, place, and time [Normal Affect] : the affect was normal [Normal Mood] : the mood was normal [Kyphosis] : no kyphosis present [Scoliosis] : no scoliosis [Acanthosis Nigricans] : no acanthosis nigricans [de-identified] : Obesity, hypothyroid

## 2024-04-30 NOTE — REASON FOR VISIT
[Initial Evaluation] : an initial evaluation [Hypothyroidism] : hypothyroidism [Weight Management/Obesity] : weight management/obesity [FreeTextEntry2] : Dr. Soler

## 2024-04-30 NOTE — HISTORY OF PRESENT ILLNESS
CHIEF COMPLAINT:    Chief Complaint   Patient presents with   • Kidney Problem     follow up for a kidney infection       SUBJECTIVE:  Viridiana Saldana is a 41 year old female who presents for follow-up on a kidney infection as recommended by the urgent care physician who saw her initially for this problem. She has taken Cipro for 10 days with no adverse effects and she feels most of her symptoms have resolved except she feels a little weak and she still has some back pain in the midline. She denies fevers, chills, nausea, vomiting, diarrhea. Is not pregnant.      HISTORY:  Past Medical History   Diagnosis Date   • Chlamydia      in teens   • Gonorrhea      in teens   • Trichmonas 10/2015   • Trichomonas      in teens   • Unspecified sinusitis (chronic)        Past Surgical History   Procedure Laterality Date   • Tubal ligation  1996   • Removal of tonsils,10+ y/o  age 27   • Colposcopy  2000   • [place] tooth extraction     • Hysteroscopy,ablation endometrium  10/8/2015       Social History     Social History   • Marital status: Single     Spouse name: N/A   • Number of children: N/A   • Years of education: N/A     Occupational History   •  [employer]     Social History Main Topics   • Smoking status: Never Smoker   • Smokeless tobacco: Never Used   • Alcohol use 0.0 oz/week     0 Standard drinks or equivalent per week      Comment: occasional   • Drug use: No   • Sexual activity: Yes     Partners: Male      Comment: tubal     Other Topics Concern   • Exercise No   • Self-Exams Yes     Social History Narrative    Lives with daughter  and grand daughter       Current Outpatient Prescriptions   Medication Sig Dispense Refill   • butalbital-acetaminophen-caffeine (FIORICET) -40 MG capsule 1 cap every 6 hrs as needed for headache 20 capsule 0     No current facility-administered medications for this visit.        Allergies as of 01/31/2017   • (No Known Allergies)          Review of  systems:  Constitutional: Patient denies fever, chills, tiredness or malaise.  Skin: Denies rash, itching.  HEENT: Denies change in visual acuity, pain, burning or itching.Denies sinus problems, ear infections, nasal congestion or sore throat.  Respiratory: Denies cough, shortness of breath.  Cardiovascular: Denies chest pain, edema.  Gastrointestinal: Denies abdominal pain, nausea, vomiting, bloody stools or diarrhea.  Genitourinary: Denies urine retention, painful urination, urinary frequency, blood in urine or nocturia.  Extremities:  Denies back pain, neck pain, joint pain or leg swelling.    Physical Exam:  Vital Signs:    Vitals:    01/31/17 1215   BP: 132/80   Pulse: 72   Resp: 16   Temp: 97.9 °F (36.6 °C)   TempSrc: Oral   Weight: 75.9 kg     Integument:  Warm. Dry. No erythema. No rash.  HENT:  Normocephalic. Atraumatic. Bilateral external ears normal. Oropharynx moist. No oral exudates. Nose normal.  Neck: Normal range of motion. No tenderness. Supple. No stridor.  Eyes: Pupils equal, round, reactive to light. Extraocular movements intact. Conjunctivae normal. No discharge.  Cardiovascular:  Normal heart rate. Normal rhythm. No murmurs. No rubs. No gallops.  Respiratory:  Normal breath sounds. No respiratory distress. No wheezing. No chest tenderness.  Gastrointestinal:  Bowel sounds normal. Soft. No tenderness. No masses. No pulsatile masses.  Neurologic:  Alert & oriented x 3. Normal motor function. Normal sensory function. No focal deficits noted.    ASSESSMENT:    Acute pyelonephritis resolved.  PLAN:    Patient is encouraged to push fluids and cranberry juice. Take Tylenol or ibuprofen as needed for the back pain. Follow up with her primary care physician for any future concerns.    Janeen Martinez MD       [FreeTextEntry1] : Patient had recent stent placement, history hypothyroid x 20 years. Patient taking Levothyroxine 50 mcg daily. Patient works .

## 2024-04-30 NOTE — ASSESSMENT
[Weight Loss] : weight loss [Levothyroxine] : The patient was instructed to take Levothyroxine on an empty stomach, separate from vitamins, and wait at least 30 minutes before eating [FreeTextEntry4] : [address] [FreeTextEntry1] : Hypothyroidism Levothyroxine 50 mcg po daily empty stomach water only

## 2024-04-30 NOTE — PAST MEDICAL HISTORY
[Postmenopausal] : The patient is postmenopausal [Menopause Age____] : age at menopause was [unfilled] [Total Preg ___] : G[unfilled] [Live Births ___] : P[unfilled]  [Full Term ___] : Full Term: [unfilled] [Abortions ___] : Abortions:[unfilled] [Living ___] : Living: [unfilled] [AB Spont ___] : miscarriages: [unfilled]

## 2024-05-02 ENCOUNTER — APPOINTMENT (OUTPATIENT)
Dept: CARDIOLOGY | Facility: CLINIC | Age: 59
End: 2024-05-02
Payer: COMMERCIAL

## 2024-05-02 VITALS — DIASTOLIC BLOOD PRESSURE: 72 MMHG | SYSTOLIC BLOOD PRESSURE: 140 MMHG | HEART RATE: 74 BPM

## 2024-05-02 VITALS — HEIGHT: 62 IN | TEMPERATURE: 97.6 F | WEIGHT: 213 LBS | BODY MASS INDEX: 39.2 KG/M2

## 2024-05-02 DIAGNOSIS — E03.9 HYPOTHYROIDISM, UNSPECIFIED: ICD-10-CM

## 2024-05-02 DIAGNOSIS — E78.5 HYPERLIPIDEMIA, UNSPECIFIED: ICD-10-CM

## 2024-05-02 DIAGNOSIS — R00.2 PALPITATIONS: ICD-10-CM

## 2024-05-02 DIAGNOSIS — I10 ESSENTIAL (PRIMARY) HYPERTENSION: ICD-10-CM

## 2024-05-02 DIAGNOSIS — I25.10 ATHEROSCLEROTIC HEART DISEASE OF NATIVE CORONARY ARTERY W/OUT ANGINA PECTORIS: ICD-10-CM

## 2024-05-02 DIAGNOSIS — E04.1 NONTOXIC SINGLE THYROID NODULE: ICD-10-CM

## 2024-05-02 LAB
ALBUMIN SERPL ELPH-MCNC: 4.7 G/DL
ALP BLD-CCNC: 109 U/L
ALT SERPL-CCNC: 29 U/L
ANION GAP SERPL CALC-SCNC: 16 MMOL/L
AST SERPL-CCNC: 18 U/L
BILIRUB SERPL-MCNC: 0.4 MG/DL
BUN SERPL-MCNC: 17 MG/DL
CALCIUM SERPL-MCNC: 9.1 MG/DL
CHLORIDE SERPL-SCNC: 105 MMOL/L
CHOLEST SERPL-MCNC: 149 MG/DL
CK SERPL-CCNC: 113 U/L
CO2 SERPL-SCNC: 21 MMOL/L
CREAT SERPL-MCNC: 0.7 MG/DL
EGFR: 99 ML/MIN/1.73M2
ESTIMATED AVERAGE GLUCOSE: 117 MG/DL
GLUCOSE SERPL-MCNC: 95 MG/DL
HBA1C MFR BLD HPLC: 5.7 %
HCT VFR BLD CALC: 40 %
HDLC SERPL-MCNC: 58 MG/DL
HGB BLD-MCNC: 13.3 G/DL
LDLC SERPL CALC-MCNC: 71 MG/DL
MCHC RBC-ENTMCNC: 30.7 PG
MCHC RBC-ENTMCNC: 33.3 G/DL
MCV RBC AUTO: 92.4 FL
NONHDLC SERPL-MCNC: 91 MG/DL
PLATELET # BLD AUTO: 224 K/UL
PMV BLD AUTO: 0 /100 WBCS
PMV BLD: 10.1 FL
POTASSIUM SERPL-SCNC: 4.6 MMOL/L
PROT SERPL-MCNC: 7 G/DL
RBC # BLD: 4.33 M/UL
RBC # FLD: 12.3 %
SODIUM SERPL-SCNC: 142 MMOL/L
T3 SERPL-MCNC: 116 NG/DL
T4 FREE SERPL-MCNC: 1.4 NG/DL
TRIGL SERPL-MCNC: 99 MG/DL
TSH SERPL-ACNC: 2.92 UIU/ML
WBC # FLD AUTO: 5.91 K/UL

## 2024-05-02 PROCEDURE — 93000 ELECTROCARDIOGRAM COMPLETE: CPT

## 2024-05-02 PROCEDURE — 99214 OFFICE O/P EST MOD 30 MIN: CPT

## 2024-05-02 PROCEDURE — G2211 COMPLEX E/M VISIT ADD ON: CPT

## 2024-05-02 RX ORDER — METOPROLOL SUCCINATE 50 MG/1
50 TABLET, EXTENDED RELEASE ORAL DAILY
Qty: 90 | Refills: 3 | Status: ACTIVE | COMMUNITY
Start: 1900-01-01 | End: 1900-01-01

## 2024-05-02 NOTE — HISTORY OF PRESENT ILLNESS
[FreeTextEntry1] : Ms. Lake is a 59yo F with PMHx of CAD s/p PCI, HTN, HLD, hypothyroid who presents for follow up. Her PMD Is Dr. Stanley Soler. She has been having chest burning with exertion along with SOB. For last few weeks. She had stress test many years ago. She is able to walk about 0.5 miles before symptoms occur. She can walk up 1-2 flights of stairs without symptoms. She will get some skipped beats at night.  11/08/23-Patient feels the same. Pending CCTA next week.  12/11/23-Pt had CCTA which showed severe LAD disease. She went for [facility] and found to have severe dRCA disease s/p PAM. She is pending staged PCI for LAD disease. She is feeling better. She still gets some SOB with ambulation but improved.  01/10/24: Patient is s/p staged PCI of pLAD on 12/28/2023, RCA stent was found to be patent. She is somewhat forgetful.  05/02/24-She has been having some chest pain. Mostly with more exertion. Will subside with rest.

## 2024-05-02 NOTE — REASON FOR VISIT
[Other: ____] : [unfilled] [FreeTextEntry1] : Diagnostic Tests:  ----------------------- EK24-NSR. Normal EKG.  01/10/24-NSR. TWF.  23-NSR. Normal EKG.  23-NSR. Normal EKG.  ----------------------- Echo:  10/03/23-TTE: EF 61%, no significant valvular disease.  ------------------------ CT:  23-CCTA: CAC 66.2. LM none. mLAD 75%, dLAD 50%, pLCx 20-30%, RCA none. Multiple pulmonary nodules (calcified).  ------------------------- Cath:  23- LHC, staged pLAD 23-LHC: LM none, pLAD 80%, mLAD moderate, dLAD mild, D1 mild, LCx mild, pRCA mild, mRCA mild, dRCA 95%. LVEDP 17 mmHg. S/P PCI to mdRCA with [place] RX 3.5x18 mm PAM.

## 2024-05-02 NOTE — ASSESSMENT
[FreeTextEntry1] : CAD: 2VD. pLAD and dRCA -S/P PAM to dRCA and pLAD.  -Continue with ASA, Plavix, atorvastatin, lisinopril 20mg, and Toprol.  -Increase Toprol 25mg to 50mg PO daily.  -NTG given as well. -Instructed on worsening symptoms to go to ED for further evaluation.   HTN: BP not at goal per ACC/AHA 2018 guidelines -Continue with lisinopril 20mg PO daily. -Increasing Toprol. -Will recheck at next visit.   HLD: -Continue atorvastatin 40mg PO daily.   Hypothyroid: -Continue with levothyroxine.  Palpitations: -If continuing, will consider MCOT.  Pulmonary nodules:  -Pulm follow up.   Thyroid nodule:  -Endocrine referral.   Follow up in 4-6 weeks.

## 2024-05-24 LAB
24R-OH-CALCIDIOL SERPL-MCNC: 44.5 PG/ML
APPEARANCE: CLEAR
BILIRUBIN URINE: NEGATIVE
BLOOD URINE: NEGATIVE
COLOR: YELLOW
GLUCOSE QUALITATIVE U: NEGATIVE MG/DL
KETONES URINE: NEGATIVE MG/DL
LEUKOCYTE ESTERASE URINE: ABNORMAL
NITRITE URINE: NEGATIVE
PH URINE: 6
PROTEIN URINE: NEGATIVE MG/DL
SPECIFIC GRAVITY URINE: 1.01
THYROGLOB AB SERPL-ACNC: <20 IU/ML
THYROPEROXIDASE AB SERPL IA-ACNC: <10 IU/ML
UROBILINOGEN URINE: 0.2 MG/DL
VIT B12 SERPL-MCNC: 576 PG/ML

## 2024-06-27 ENCOUNTER — RX RENEWAL (OUTPATIENT)
Age: 59
End: 2024-06-27

## 2024-06-27 ENCOUNTER — APPOINTMENT (OUTPATIENT)
Dept: CARDIOLOGY | Facility: CLINIC | Age: 59
End: 2024-06-27
Payer: COMMERCIAL

## 2024-06-27 VITALS — BODY MASS INDEX: 39.38 KG/M2 | HEIGHT: 62 IN | WEIGHT: 214 LBS

## 2024-06-27 VITALS — HEART RATE: 70 BPM | SYSTOLIC BLOOD PRESSURE: 128 MMHG | DIASTOLIC BLOOD PRESSURE: 80 MMHG

## 2024-06-27 PROCEDURE — 93000 ELECTROCARDIOGRAM COMPLETE: CPT

## 2024-06-27 PROCEDURE — G2211 COMPLEX E/M VISIT ADD ON: CPT

## 2024-06-27 PROCEDURE — 99214 OFFICE O/P EST MOD 30 MIN: CPT

## 2024-08-23 ENCOUNTER — OUTPATIENT (OUTPATIENT)
Dept: OUTPATIENT SERVICES | Facility: HOSPITAL | Age: 59
LOS: 1 days | End: 2024-08-23
Payer: COMMERCIAL

## 2024-08-23 DIAGNOSIS — Z98.890 OTHER SPECIFIED POSTPROCEDURAL STATES: Chronic | ICD-10-CM

## 2024-08-23 DIAGNOSIS — M25.511 PAIN IN RIGHT SHOULDER: ICD-10-CM

## 2024-08-23 PROCEDURE — 73030 X-RAY EXAM OF SHOULDER: CPT | Mod: 26,RT

## 2024-08-23 PROCEDURE — 73030 X-RAY EXAM OF SHOULDER: CPT | Mod: RT

## 2024-08-24 DIAGNOSIS — M25.511 PAIN IN RIGHT SHOULDER: ICD-10-CM

## 2024-09-05 ENCOUNTER — APPOINTMENT (OUTPATIENT)
Dept: CARDIOLOGY | Facility: CLINIC | Age: 59
End: 2024-09-05
Payer: COMMERCIAL

## 2024-09-05 VITALS — SYSTOLIC BLOOD PRESSURE: 144 MMHG | HEART RATE: 66 BPM | DIASTOLIC BLOOD PRESSURE: 80 MMHG

## 2024-09-05 VITALS — BODY MASS INDEX: 40.3 KG/M2 | WEIGHT: 219 LBS | HEIGHT: 62 IN

## 2024-09-05 VITALS — DIASTOLIC BLOOD PRESSURE: 84 MMHG | SYSTOLIC BLOOD PRESSURE: 130 MMHG

## 2024-09-05 DIAGNOSIS — E04.1 NONTOXIC SINGLE THYROID NODULE: ICD-10-CM

## 2024-09-05 DIAGNOSIS — E78.5 HYPERLIPIDEMIA, UNSPECIFIED: ICD-10-CM

## 2024-09-05 DIAGNOSIS — E03.9 HYPOTHYROIDISM, UNSPECIFIED: ICD-10-CM

## 2024-09-05 DIAGNOSIS — I10 ESSENTIAL (PRIMARY) HYPERTENSION: ICD-10-CM

## 2024-09-05 DIAGNOSIS — I25.10 ATHEROSCLEROTIC HEART DISEASE OF NATIVE CORONARY ARTERY W/OUT ANGINA PECTORIS: ICD-10-CM

## 2024-09-05 DIAGNOSIS — R00.2 PALPITATIONS: ICD-10-CM

## 2024-09-05 PROCEDURE — 99214 OFFICE O/P EST MOD 30 MIN: CPT

## 2024-09-05 PROCEDURE — G2211 COMPLEX E/M VISIT ADD ON: CPT

## 2024-09-05 PROCEDURE — 93000 ELECTROCARDIOGRAM COMPLETE: CPT

## 2024-09-05 NOTE — HISTORY OF PRESENT ILLNESS
[FreeTextEntry1] : Ms. Lake is a 59yo F with PMHx of CAD s/p PCI, HTN, HLD, hypothyroid who presents for follow up. Her PMD Is Dr. Stanley Soler. She has been having chest burning with exertion along with SOB. For last few weeks. She had stress test many years ago. She is able to walk about 0.5 miles before symptoms occur. She can walk up 1-2 flights of stairs without symptoms. She will get some skipped beats at night.  11/08/23-Patient feels the same. Pending CCTA next week.  12/11/23-Pt had CCTA which showed severe LAD disease. She went for [facility] and found to have severe dRCA disease s/p PAM. She is pending staged PCI for LAD disease. She is feeling better. She still gets some SOB with ambulation but improved.  01/10/24: Patient is s/p staged PCI of pLAD on 12/28/2023, RCA stent was found to be patent. She is somewhat forgetful.  05/02/24-She has been having some chest pain. Mostly with more exertion. Will subside with rest.  06/27/24-She is feeling better with increased dose of Toprol. Some mild fatigue.  09/05/24-Patient feeling better. Used NTG once.

## 2024-09-05 NOTE — ASSESSMENT
[FreeTextEntry1] : CAD: 2VD. pLAD and dRCA -S/P PAM to dRCA and pLAD.  -Continue with ASA, Plavix, atorvastatin, lisinopril 20mg, and Toprol 50mg PO daily.  -NTG given as well. -Instructed on worsening symptoms to go to ED for further evaluation.   HTN: BP at goal per ACC/AHA 2018 guidelines -Continue with lisinopril 20mg PO daily. -Will recheck at next visit.   HLD:, TG 99, HDL 58, LDL 71 (05/24) -Continue atorvastatin 40mg PO daily.   Hypothyroid: -Continue with levothyroxine.  Palpitations: -If continuing, will consider MCOT.  Prediabetes: HA1c 5.7%  -Lifestyle changes for improved glucose control.   Pulmonary nodules:  -Pulm follow up.   Thyroid nodule:  -Endocrine referral.   Follow up in 6 months

## 2024-09-16 ENCOUNTER — APPOINTMENT (OUTPATIENT)
Dept: ENDOCRINOLOGY | Facility: CLINIC | Age: 59
End: 2024-09-16
Payer: COMMERCIAL

## 2024-09-16 VITALS
DIASTOLIC BLOOD PRESSURE: 78 MMHG | OXYGEN SATURATION: 98 % | RESPIRATION RATE: 18 BRPM | HEIGHT: 62 IN | WEIGHT: 222 LBS | SYSTOLIC BLOOD PRESSURE: 130 MMHG | HEART RATE: 73 BPM | BODY MASS INDEX: 40.85 KG/M2

## 2024-09-16 DIAGNOSIS — I25.10 ATHEROSCLEROTIC HEART DISEASE OF NATIVE CORONARY ARTERY W/OUT ANGINA PECTORIS: ICD-10-CM

## 2024-09-16 DIAGNOSIS — E78.5 HYPERLIPIDEMIA, UNSPECIFIED: ICD-10-CM

## 2024-09-16 DIAGNOSIS — Z78.9 OTHER SPECIFIED HEALTH STATUS: ICD-10-CM

## 2024-09-16 DIAGNOSIS — Z86.39 PERSONAL HISTORY OF OTHER ENDOCRINE, NUTRITIONAL AND METABOLIC DISEASE: ICD-10-CM

## 2024-09-16 DIAGNOSIS — E66.01 MORBID (SEVERE) OBESITY DUE TO EXCESS CALORIES: ICD-10-CM

## 2024-09-16 DIAGNOSIS — Z80.1 FAMILY HISTORY OF MALIGNANT NEOPLASM OF TRACHEA, BRONCHUS AND LUNG: ICD-10-CM

## 2024-09-16 DIAGNOSIS — R73.03 PREDIABETES.: ICD-10-CM

## 2024-09-16 DIAGNOSIS — E04.1 NONTOXIC SINGLE THYROID NODULE: ICD-10-CM

## 2024-09-16 DIAGNOSIS — I10 ESSENTIAL (PRIMARY) HYPERTENSION: ICD-10-CM

## 2024-09-16 DIAGNOSIS — Z86.79 PERSONAL HISTORY OF OTHER DISEASES OF THE CIRCULATORY SYSTEM: ICD-10-CM

## 2024-09-16 DIAGNOSIS — E03.9 HYPOTHYROIDISM, UNSPECIFIED: ICD-10-CM

## 2024-09-16 DIAGNOSIS — Z82.49 FAMILY HISTORY OF ISCHEMIC HEART DISEASE AND OTHER DISEASES OF THE CIRCULATORY SYSTEM: ICD-10-CM

## 2024-09-16 PROCEDURE — 99214 OFFICE O/P EST MOD 30 MIN: CPT

## 2024-09-16 RX ORDER — METFORMIN ER 500 MG 500 MG/1
500 TABLET ORAL
Qty: 90 | Refills: 2 | Status: ACTIVE | COMMUNITY
Start: 2024-09-16 | End: 1900-01-01

## 2024-09-16 NOTE — PHYSICAL EXAM
[Alert] : alert [Well Nourished] : well nourished [Obese] : obese [No Acute Distress] : no acute distress [Well Developed] : well developed [Normal Sclera/Conjunctiva] : normal sclera/conjunctiva [EOMI] : extra ocular movement intact [PERRL] : pupils equal, round and reactive to light [No Proptosis] : no proptosis [Normal Outer Ear/Nose] : the ears and nose were normal in appearance [Normal Hearing] : hearing was normal [Supple] : the neck was supple [Thyroid Not Enlarged] : the thyroid was not enlarged [No Respiratory Distress] : no respiratory distress [No Accessory Muscle Use] : no accessory muscle use [Normal Rate and Effort] : normal respiratory rate and effort [Clear to Auscultation] : lungs were clear to auscultation bilaterally [Normal S1, S2] : normal S1 and S2 [Normal Rate] : heart rate was normal [Regular Rhythm] : with a regular rhythm [Carotids Normal] : carotid pulses were normal with no bruits [No Edema] : no peripheral edema [Pedal Pulses Normal] : the pedal pulses are present [Normal Bowel Sounds] : normal bowel sounds [Not Tender] : non-tender [Not Distended] : not distended [Soft] : abdomen soft [Normal Anterior Cervical Nodes] : no anterior cervical lymphadenopathy [No CVA Tenderness] : no ~M costovertebral angle tenderness [No Spinal Tenderness] : no spinal tenderness [Spine Straight] : spine straight [No Stigmata of Cushings Syndrome] : no stigmata of Cushings Syndrome [Normal Gait] : normal gait [Normal Strength/Tone] : muscle strength and tone were normal [No Rash] : no rash [No Motor Deficits] : the motor exam was normal [Normal Reflexes] : deep tendon reflexes were 2+ and symmetric [No Tremors] : no tremors [Oriented x3] : oriented to person, place, and time [Normal Affect] : the affect was normal [Normal Mood] : the mood was normal [Kyphosis] : no kyphosis present [Scoliosis] : no scoliosis [Acanthosis Nigricans] : no acanthosis nigricans [de-identified] :  6/10/24 Left 1 x 0.7 x 1.0 cm anterior interpolar region, TR 4 [de-identified] : Obesity, hypothyroid

## 2024-09-16 NOTE — HISTORY OF PRESENT ILLNESS
[FreeTextEntry1] : Patient following up with DR. Vo due to CAD and had stents placed x 2 in the past year. Review labs with patient.

## 2024-09-16 NOTE — PHYSICAL EXAM
[Alert] : alert [Well Nourished] : well nourished [Obese] : obese [No Acute Distress] : no acute distress [Well Developed] : well developed [Normal Sclera/Conjunctiva] : normal sclera/conjunctiva [EOMI] : extra ocular movement intact [PERRL] : pupils equal, round and reactive to light [No Proptosis] : no proptosis [Normal Outer Ear/Nose] : the ears and nose were normal in appearance [Normal Hearing] : hearing was normal [Supple] : the neck was supple [Thyroid Not Enlarged] : the thyroid was not enlarged [No Respiratory Distress] : no respiratory distress [No Accessory Muscle Use] : no accessory muscle use [Normal Rate and Effort] : normal respiratory rate and effort [Clear to Auscultation] : lungs were clear to auscultation bilaterally [Normal S1, S2] : normal S1 and S2 [Normal Rate] : heart rate was normal [Regular Rhythm] : with a regular rhythm [Carotids Normal] : carotid pulses were normal with no bruits [No Edema] : no peripheral edema [Pedal Pulses Normal] : the pedal pulses are present [Normal Bowel Sounds] : normal bowel sounds [Not Tender] : non-tender [Not Distended] : not distended [Soft] : abdomen soft [Normal Anterior Cervical Nodes] : no anterior cervical lymphadenopathy [No CVA Tenderness] : no ~M costovertebral angle tenderness [No Spinal Tenderness] : no spinal tenderness [Spine Straight] : spine straight [No Stigmata of Cushings Syndrome] : no stigmata of Cushings Syndrome [Normal Gait] : normal gait [Normal Strength/Tone] : muscle strength and tone were normal [No Rash] : no rash [No Motor Deficits] : the motor exam was normal [Normal Reflexes] : deep tendon reflexes were 2+ and symmetric [No Tremors] : no tremors [Oriented x3] : oriented to person, place, and time [Normal Affect] : the affect was normal [Normal Mood] : the mood was normal [Kyphosis] : no kyphosis present [Scoliosis] : no scoliosis [Acanthosis Nigricans] : no acanthosis nigricans [de-identified] :  6/10/24 Left 1 x 0.7 x 1.0 cm anterior interpolar region, TR 4 [de-identified] : Obesity, hypothyroid

## 2024-09-16 NOTE — ASSESSMENT
[Weight Loss] : weight loss [Levothyroxine] : The patient was instructed to take Levothyroxine on an empty stomach, separate from vitamins, and wait at least 30 minutes before eating [FreeTextEntry1] : 1. Hypothyroidism Levothyroxine 50 mcg po daily empty stomach water only   2. Obesity [address] diet and exercise Metformin 500 mg ER after dinner.   3. thyroid nodule  6/10/24 Left 1 x 0.7 x 1.0 cm anterior interpolar region, TR 4

## 2024-12-17 ENCOUNTER — RX RENEWAL (OUTPATIENT)
Age: 59
End: 2024-12-17

## 2025-01-08 ENCOUNTER — RX RENEWAL (OUTPATIENT)
Age: 60
End: 2025-01-08

## 2025-02-12 ENCOUNTER — RX RENEWAL (OUTPATIENT)
Age: 60
End: 2025-02-12

## 2025-03-10 ENCOUNTER — APPOINTMENT (OUTPATIENT)
Dept: ORTHOPEDIC SURGERY | Facility: CLINIC | Age: 60
End: 2025-03-10
Payer: COMMERCIAL

## 2025-03-10 DIAGNOSIS — M25.512 PAIN IN LEFT SHOULDER: ICD-10-CM

## 2025-03-10 PROCEDURE — 73030 X-RAY EXAM OF SHOULDER: CPT | Mod: LT

## 2025-03-10 PROCEDURE — 99203 OFFICE O/P NEW LOW 30 MIN: CPT | Mod: 25

## 2025-03-10 PROCEDURE — 20610 DRAIN/INJ JOINT/BURSA W/O US: CPT | Mod: LT

## 2025-03-11 LAB
ALBUMIN SERPL ELPH-MCNC: 4.5 G/DL
ALP BLD-CCNC: 86 U/L
ALT SERPL-CCNC: 25 U/L
ANION GAP SERPL CALC-SCNC: 18 MMOL/L
AST SERPL-CCNC: 16 U/L
BASOPHILS # BLD AUTO: 0.02 K/UL
BASOPHILS NFR BLD AUTO: 0.4 %
BILIRUB SERPL-MCNC: 0.3 MG/DL
BUN SERPL-MCNC: 16 MG/DL
CALCIUM SERPL-MCNC: 9.2 MG/DL
CHLORIDE SERPL-SCNC: 104 MMOL/L
CHOLEST SERPL-MCNC: 180 MG/DL
CO2 SERPL-SCNC: 23 MMOL/L
CREAT SERPL-MCNC: 0.7 MG/DL
EGFRCR SERPLBLD CKD-EPI 2021: 98 ML/MIN/1.73M2
EOSINOPHIL # BLD AUTO: 0.12 K/UL
EOSINOPHIL NFR BLD AUTO: 2.1 %
ESTIMATED AVERAGE GLUCOSE: 114 MG/DL
GLUCOSE SERPL-MCNC: 111 MG/DL
HBA1C MFR BLD HPLC: 5.6 %
HCT VFR BLD CALC: 41.9 %
HDLC SERPL-MCNC: 51 MG/DL
HGB BLD-MCNC: 13.2 G/DL
IMM GRANULOCYTES NFR BLD AUTO: 0.2 %
LDLC SERPL CALC-MCNC: 104 MG/DL
LYMPHOCYTES # BLD AUTO: 2.07 K/UL
LYMPHOCYTES NFR BLD AUTO: 36.9 %
MAN DIFF?: NORMAL
MCHC RBC-ENTMCNC: 30.8 PG
MCHC RBC-ENTMCNC: 31.5 G/DL
MCV RBC AUTO: 97.7 FL
MONOCYTES # BLD AUTO: 0.45 K/UL
MONOCYTES NFR BLD AUTO: 8 %
NEUTROPHILS # BLD AUTO: 2.94 K/UL
NEUTROPHILS NFR BLD AUTO: 52.4 %
NONHDLC SERPL-MCNC: 129 MG/DL
PLATELET # BLD AUTO: 253 K/UL
PMV BLD AUTO: 0 /100 WBCS
PMV BLD: 10 FL
POTASSIUM SERPL-SCNC: 4.3 MMOL/L
PROT SERPL-MCNC: 6.8 G/DL
RBC # BLD: 4.29 M/UL
RBC # FLD: 13.2 %
SODIUM SERPL-SCNC: 145 MMOL/L
TRIGL SERPL-MCNC: 142 MG/DL
WBC # FLD AUTO: 5.61 K/UL

## 2025-03-12 ENCOUNTER — NON-APPOINTMENT (OUTPATIENT)
Age: 60
End: 2025-03-12

## 2025-03-12 ENCOUNTER — APPOINTMENT (OUTPATIENT)
Dept: CARDIOLOGY | Facility: CLINIC | Age: 60
End: 2025-03-12
Payer: COMMERCIAL

## 2025-03-12 VITALS
SYSTOLIC BLOOD PRESSURE: 112 MMHG | BODY MASS INDEX: 42.14 KG/M2 | HEART RATE: 67 BPM | HEIGHT: 62 IN | WEIGHT: 229 LBS | DIASTOLIC BLOOD PRESSURE: 70 MMHG

## 2025-03-12 DIAGNOSIS — I10 ESSENTIAL (PRIMARY) HYPERTENSION: ICD-10-CM

## 2025-03-12 DIAGNOSIS — R73.03 PREDIABETES.: ICD-10-CM

## 2025-03-12 DIAGNOSIS — R07.9 CHEST PAIN, UNSPECIFIED: ICD-10-CM

## 2025-03-12 DIAGNOSIS — I25.10 ATHEROSCLEROTIC HEART DISEASE OF NATIVE CORONARY ARTERY W/OUT ANGINA PECTORIS: ICD-10-CM

## 2025-03-12 DIAGNOSIS — E78.5 HYPERLIPIDEMIA, UNSPECIFIED: ICD-10-CM

## 2025-03-12 DIAGNOSIS — E03.9 HYPOTHYROIDISM, UNSPECIFIED: ICD-10-CM

## 2025-03-12 DIAGNOSIS — R00.2 PALPITATIONS: ICD-10-CM

## 2025-03-12 LAB — TSH SERPL-ACNC: 3.52 UIU/ML

## 2025-03-12 PROCEDURE — G2211 COMPLEX E/M VISIT ADD ON: CPT

## 2025-03-12 PROCEDURE — 93000 ELECTROCARDIOGRAM COMPLETE: CPT

## 2025-03-12 PROCEDURE — 99214 OFFICE O/P EST MOD 30 MIN: CPT

## 2025-03-12 RX ORDER — ISOSORBIDE MONONITRATE 30 MG/1
30 TABLET, EXTENDED RELEASE ORAL DAILY
Qty: 90 | Refills: 3 | Status: ACTIVE | COMMUNITY
Start: 2025-03-12 | End: 1900-01-01

## 2025-03-13 ENCOUNTER — APPOINTMENT (OUTPATIENT)
Dept: ENDOCRINOLOGY | Facility: CLINIC | Age: 60
End: 2025-03-13

## 2025-05-01 ENCOUNTER — APPOINTMENT (OUTPATIENT)
Dept: ORTHOPEDIC SURGERY | Facility: CLINIC | Age: 60
End: 2025-05-01
Payer: COMMERCIAL

## 2025-05-01 DIAGNOSIS — M25.512 PAIN IN LEFT SHOULDER: ICD-10-CM

## 2025-05-01 PROCEDURE — 99213 OFFICE O/P EST LOW 20 MIN: CPT

## 2025-07-03 ENCOUNTER — APPOINTMENT (OUTPATIENT)
Dept: ORTHOPEDIC SURGERY | Facility: CLINIC | Age: 60
End: 2025-07-03
Payer: COMMERCIAL

## 2025-07-03 PROCEDURE — 20610 DRAIN/INJ JOINT/BURSA W/O US: CPT | Mod: LT

## 2025-07-03 PROCEDURE — 99213 OFFICE O/P EST LOW 20 MIN: CPT | Mod: 25

## 2025-08-20 ENCOUNTER — APPOINTMENT (OUTPATIENT)
Dept: CARDIOLOGY | Facility: CLINIC | Age: 60
End: 2025-08-20
Payer: COMMERCIAL

## 2025-08-20 PROCEDURE — 93306 TTE W/DOPPLER COMPLETE: CPT

## 2025-09-09 ENCOUNTER — RX RENEWAL (OUTPATIENT)
Age: 60
End: 2025-09-09

## 2025-09-10 ENCOUNTER — APPOINTMENT (OUTPATIENT)
Dept: CARDIOLOGY | Facility: CLINIC | Age: 60
End: 2025-09-10
Payer: COMMERCIAL

## 2025-09-10 ENCOUNTER — NON-APPOINTMENT (OUTPATIENT)
Age: 60
End: 2025-09-10

## 2025-09-10 VITALS
DIASTOLIC BLOOD PRESSURE: 60 MMHG | BODY MASS INDEX: 42.33 KG/M2 | WEIGHT: 230 LBS | SYSTOLIC BLOOD PRESSURE: 120 MMHG | HEIGHT: 62 IN | HEART RATE: 76 BPM

## 2025-09-10 DIAGNOSIS — R73.03 PREDIABETES.: ICD-10-CM

## 2025-09-10 DIAGNOSIS — R07.9 CHEST PAIN, UNSPECIFIED: ICD-10-CM

## 2025-09-10 DIAGNOSIS — E78.5 HYPERLIPIDEMIA, UNSPECIFIED: ICD-10-CM

## 2025-09-10 DIAGNOSIS — I10 ESSENTIAL (PRIMARY) HYPERTENSION: ICD-10-CM

## 2025-09-10 DIAGNOSIS — I25.10 ATHEROSCLEROTIC HEART DISEASE OF NATIVE CORONARY ARTERY W/OUT ANGINA PECTORIS: ICD-10-CM

## 2025-09-10 DIAGNOSIS — E03.9 HYPOTHYROIDISM, UNSPECIFIED: ICD-10-CM

## 2025-09-10 PROCEDURE — G2211 COMPLEX E/M VISIT ADD ON: CPT

## 2025-09-10 PROCEDURE — 93000 ELECTROCARDIOGRAM COMPLETE: CPT

## 2025-09-10 PROCEDURE — 99214 OFFICE O/P EST MOD 30 MIN: CPT

## 2025-09-10 RX ORDER — NITROGLYCERIN 0.4 MG/1
0.4 TABLET SUBLINGUAL
Qty: 1 | Refills: 1 | Status: ACTIVE | COMMUNITY
Start: 2025-09-10 | End: 1900-01-01